# Patient Record
Sex: FEMALE | Race: WHITE | Employment: OTHER | URBAN - METROPOLITAN AREA
[De-identification: names, ages, dates, MRNs, and addresses within clinical notes are randomized per-mention and may not be internally consistent; named-entity substitution may affect disease eponyms.]

---

## 2017-01-01 ENCOUNTER — OFFICE VISIT (OUTPATIENT)
Dept: HEMATOLOGY/ONCOLOGY | Age: 72
End: 2017-01-01
Attending: INTERNAL MEDICINE
Payer: MEDICARE

## 2017-01-01 ENCOUNTER — HOSPITAL ENCOUNTER (OUTPATIENT)
Dept: CT IMAGING | Facility: HOSPITAL | Age: 72
Discharge: HOME | End: 2017-01-01
Attending: INTERNAL MEDICINE
Payer: MEDICARE

## 2017-01-01 ENCOUNTER — OFFICE VISIT (OUTPATIENT)
Dept: SURGERY | Facility: CLINIC | Age: 72
End: 2017-01-01

## 2017-01-01 ENCOUNTER — TELEPHONE (OUTPATIENT)
Dept: HEMATOLOGY/ONCOLOGY | Facility: HOSPITAL | Age: 72
End: 2017-01-01

## 2017-01-01 ENCOUNTER — SNF/IP PROF CHARGE ONLY (OUTPATIENT)
Dept: HEMATOLOGY/ONCOLOGY | Facility: HOSPITAL | Age: 72
End: 2017-01-01

## 2017-01-01 ENCOUNTER — APPOINTMENT (OUTPATIENT)
Dept: LAB | Age: 72
End: 2017-01-01
Attending: CLINICAL NURSE SPECIALIST
Payer: MEDICARE

## 2017-01-01 ENCOUNTER — OFFICE VISIT (OUTPATIENT)
Dept: FAMILY MEDICINE CLINIC | Facility: CLINIC | Age: 72
End: 2017-01-01

## 2017-01-01 ENCOUNTER — HOSPITAL ENCOUNTER (OUTPATIENT)
Dept: CV DIAGNOSTICS | Facility: HOSPITAL | Age: 72
Discharge: HOME | End: 2017-01-01
Attending: CLINICAL NURSE SPECIALIST
Payer: MEDICARE

## 2017-01-01 ENCOUNTER — APPOINTMENT (OUTPATIENT)
Dept: HEMATOLOGY/ONCOLOGY | Age: 72
End: 2017-01-01
Attending: INTERNAL MEDICINE
Payer: MEDICARE

## 2017-01-01 ENCOUNTER — HOSPITAL ENCOUNTER (OUTPATIENT)
Dept: CT IMAGING | Age: 72
Discharge: HOME | End: 2017-01-01
Attending: INTERNAL MEDICINE
Payer: MEDICARE

## 2017-01-01 ENCOUNTER — TELEPHONE (OUTPATIENT)
Dept: FAMILY MEDICINE CLINIC | Facility: CLINIC | Age: 72
End: 2017-01-01

## 2017-01-01 ENCOUNTER — NURSE ONLY (OUTPATIENT)
Dept: HEMATOLOGY/ONCOLOGY | Facility: HOSPITAL | Age: 72
End: 2017-01-01

## 2017-01-01 VITALS
HEIGHT: 65.2 IN | TEMPERATURE: 99 F | SYSTOLIC BLOOD PRESSURE: 152 MMHG | WEIGHT: 134.5 LBS | DIASTOLIC BLOOD PRESSURE: 91 MMHG | RESPIRATION RATE: 16 BRPM | BODY MASS INDEX: 22.14 KG/M2 | HEART RATE: 59 BPM

## 2017-01-01 VITALS
DIASTOLIC BLOOD PRESSURE: 91 MMHG | HEART RATE: 76 BPM | DIASTOLIC BLOOD PRESSURE: 76 MMHG | OXYGEN SATURATION: 98 % | RESPIRATION RATE: 18 BRPM | TEMPERATURE: 99 F | OXYGEN SATURATION: 98 % | TEMPERATURE: 98 F | SYSTOLIC BLOOD PRESSURE: 144 MMHG | RESPIRATION RATE: 18 BRPM | SYSTOLIC BLOOD PRESSURE: 130 MMHG | HEART RATE: 67 BPM | BODY MASS INDEX: 21 KG/M2 | WEIGHT: 126.69 LBS

## 2017-01-01 VITALS
RESPIRATION RATE: 16 BRPM | DIASTOLIC BLOOD PRESSURE: 79 MMHG | HEART RATE: 67 BPM | OXYGEN SATURATION: 97 % | WEIGHT: 131.19 LBS | BODY MASS INDEX: 22 KG/M2 | TEMPERATURE: 99 F | SYSTOLIC BLOOD PRESSURE: 163 MMHG

## 2017-01-01 VITALS
RESPIRATION RATE: 18 BRPM | HEART RATE: 64 BPM | SYSTOLIC BLOOD PRESSURE: 148 MMHG | DIASTOLIC BLOOD PRESSURE: 94 MMHG | HEART RATE: 53 BPM | DIASTOLIC BLOOD PRESSURE: 81 MMHG | BODY MASS INDEX: 22 KG/M2 | BODY MASS INDEX: 22 KG/M2 | WEIGHT: 132 LBS | WEIGHT: 133.69 LBS | TEMPERATURE: 99 F | SYSTOLIC BLOOD PRESSURE: 165 MMHG | TEMPERATURE: 99 F | OXYGEN SATURATION: 97 % | RESPIRATION RATE: 18 BRPM | OXYGEN SATURATION: 98 %

## 2017-01-01 VITALS
BODY MASS INDEX: 23 KG/M2 | RESPIRATION RATE: 18 BRPM | HEART RATE: 68 BPM | DIASTOLIC BLOOD PRESSURE: 79 MMHG | TEMPERATURE: 97 F | SYSTOLIC BLOOD PRESSURE: 169 MMHG | WEIGHT: 136.88 LBS

## 2017-01-01 VITALS
RESPIRATION RATE: 16 BRPM | DIASTOLIC BLOOD PRESSURE: 95 MMHG | SYSTOLIC BLOOD PRESSURE: 167 MMHG | TEMPERATURE: 99 F | HEART RATE: 67 BPM

## 2017-01-01 VITALS
TEMPERATURE: 99 F | SYSTOLIC BLOOD PRESSURE: 165 MMHG | OXYGEN SATURATION: 99 % | RESPIRATION RATE: 16 BRPM | DIASTOLIC BLOOD PRESSURE: 100 MMHG | HEART RATE: 86 BPM | WEIGHT: 130.31 LBS | BODY MASS INDEX: 22 KG/M2

## 2017-01-01 VITALS
BODY MASS INDEX: 20.68 KG/M2 | SYSTOLIC BLOOD PRESSURE: 150 MMHG | OXYGEN SATURATION: 99 % | DIASTOLIC BLOOD PRESSURE: 87 MMHG | RESPIRATION RATE: 16 BRPM | HEIGHT: 65.2 IN | TEMPERATURE: 99 F | HEART RATE: 69 BPM | WEIGHT: 125.63 LBS

## 2017-01-01 VITALS
TEMPERATURE: 100 F | WEIGHT: 127.88 LBS | DIASTOLIC BLOOD PRESSURE: 70 MMHG | RESPIRATION RATE: 16 BRPM | BODY MASS INDEX: 21 KG/M2 | SYSTOLIC BLOOD PRESSURE: 145 MMHG | OXYGEN SATURATION: 100 % | HEART RATE: 102 BPM

## 2017-01-01 VITALS
WEIGHT: 135 LBS | HEART RATE: 80 BPM | SYSTOLIC BLOOD PRESSURE: 142 MMHG | TEMPERATURE: 98 F | DIASTOLIC BLOOD PRESSURE: 90 MMHG | BODY MASS INDEX: 22.49 KG/M2 | HEIGHT: 65 IN | RESPIRATION RATE: 16 BRPM

## 2017-01-01 VITALS
BODY MASS INDEX: 23 KG/M2 | TEMPERATURE: 98 F | HEART RATE: 59 BPM | RESPIRATION RATE: 16 BRPM | WEIGHT: 135.69 LBS | DIASTOLIC BLOOD PRESSURE: 102 MMHG | SYSTOLIC BLOOD PRESSURE: 163 MMHG

## 2017-01-01 VITALS
BODY MASS INDEX: 22 KG/M2 | OXYGEN SATURATION: 97 % | SYSTOLIC BLOOD PRESSURE: 147 MMHG | HEART RATE: 50 BPM | RESPIRATION RATE: 18 BRPM | DIASTOLIC BLOOD PRESSURE: 81 MMHG | TEMPERATURE: 99 F | WEIGHT: 132.63 LBS

## 2017-01-01 VITALS
OXYGEN SATURATION: 97 % | HEART RATE: 77 BPM | WEIGHT: 124.31 LBS | RESPIRATION RATE: 18 BRPM | SYSTOLIC BLOOD PRESSURE: 144 MMHG | BODY MASS INDEX: 21 KG/M2 | DIASTOLIC BLOOD PRESSURE: 83 MMHG | TEMPERATURE: 99 F

## 2017-01-01 VITALS
WEIGHT: 135.38 LBS | SYSTOLIC BLOOD PRESSURE: 156 MMHG | DIASTOLIC BLOOD PRESSURE: 75 MMHG | HEART RATE: 63 BPM | TEMPERATURE: 98 F | HEIGHT: 65 IN | RESPIRATION RATE: 18 BRPM | BODY MASS INDEX: 22.56 KG/M2

## 2017-01-01 VITALS
RESPIRATION RATE: 18 BRPM | SYSTOLIC BLOOD PRESSURE: 170 MMHG | WEIGHT: 133 LBS | HEART RATE: 68 BPM | OXYGEN SATURATION: 97 % | DIASTOLIC BLOOD PRESSURE: 80 MMHG | BODY MASS INDEX: 22 KG/M2 | TEMPERATURE: 99 F

## 2017-01-01 VITALS
DIASTOLIC BLOOD PRESSURE: 76 MMHG | HEART RATE: 120 BPM | RESPIRATION RATE: 18 BRPM | TEMPERATURE: 98 F | OXYGEN SATURATION: 96 % | SYSTOLIC BLOOD PRESSURE: 111 MMHG

## 2017-01-01 VITALS
OXYGEN SATURATION: 98 % | RESPIRATION RATE: 18 BRPM | WEIGHT: 126.88 LBS | SYSTOLIC BLOOD PRESSURE: 155 MMHG | DIASTOLIC BLOOD PRESSURE: 96 MMHG | HEART RATE: 91 BPM | BODY MASS INDEX: 21 KG/M2 | TEMPERATURE: 98 F

## 2017-01-01 VITALS
OXYGEN SATURATION: 96 % | WEIGHT: 132.69 LBS | TEMPERATURE: 99 F | SYSTOLIC BLOOD PRESSURE: 176 MMHG | DIASTOLIC BLOOD PRESSURE: 85 MMHG | RESPIRATION RATE: 18 BRPM | HEART RATE: 64 BPM | BODY MASS INDEX: 22 KG/M2

## 2017-01-01 VITALS
WEIGHT: 127.13 LBS | HEART RATE: 82 BPM | TEMPERATURE: 99 F | SYSTOLIC BLOOD PRESSURE: 155 MMHG | OXYGEN SATURATION: 98 % | BODY MASS INDEX: 21 KG/M2 | DIASTOLIC BLOOD PRESSURE: 82 MMHG | RESPIRATION RATE: 18 BRPM

## 2017-01-01 VITALS
WEIGHT: 134.63 LBS | TEMPERATURE: 98 F | SYSTOLIC BLOOD PRESSURE: 162 MMHG | RESPIRATION RATE: 18 BRPM | OXYGEN SATURATION: 97 % | BODY MASS INDEX: 22 KG/M2 | DIASTOLIC BLOOD PRESSURE: 66 MMHG

## 2017-01-01 VITALS
HEIGHT: 65 IN | BODY MASS INDEX: 21.66 KG/M2 | DIASTOLIC BLOOD PRESSURE: 76 MMHG | TEMPERATURE: 99 F | SYSTOLIC BLOOD PRESSURE: 136 MMHG | WEIGHT: 130 LBS | HEART RATE: 70 BPM

## 2017-01-01 DIAGNOSIS — C77.3 METASTATIC CANCER TO AXILLARY LYMPH NODES (HCC): ICD-10-CM

## 2017-01-01 DIAGNOSIS — C50.112 MALIGNANT NEOPLASM OF CENTRAL PORTION OF LEFT FEMALE BREAST, UNSPECIFIED ESTROGEN RECEPTOR STATUS (HCC): ICD-10-CM

## 2017-01-01 DIAGNOSIS — C78.7 METASTATIC CANCER TO LIVER (HCC): ICD-10-CM

## 2017-01-01 DIAGNOSIS — C50.112 MALIGNANT NEOPLASM OF CENTRAL PORTION OF LEFT BREAST IN FEMALE, ESTROGEN RECEPTOR POSITIVE (HCC): Primary | ICD-10-CM

## 2017-01-01 DIAGNOSIS — K52.1 CHEMOTHERAPY INDUCED DIARRHEA: ICD-10-CM

## 2017-01-01 DIAGNOSIS — C50.112 MALIGNANT NEOPLASM OF CENTRAL PORTION OF LEFT FEMALE BREAST (HCC): ICD-10-CM

## 2017-01-01 DIAGNOSIS — C50.112 MALIGNANT NEOPLASM OF CENTRAL PORTION OF LEFT BREAST IN FEMALE, ESTROGEN RECEPTOR POSITIVE (HCC): ICD-10-CM

## 2017-01-01 DIAGNOSIS — Z17.0 MALIGNANT NEOPLASM OF CENTRAL PORTION OF LEFT BREAST IN FEMALE, ESTROGEN RECEPTOR POSITIVE (HCC): ICD-10-CM

## 2017-01-01 DIAGNOSIS — T45.1X5D ADVERSE EFFECT OF CHEMOTHERAPY, SUBSEQUENT ENCOUNTER: ICD-10-CM

## 2017-01-01 DIAGNOSIS — C78.7 LIVER METASTASES (HCC): ICD-10-CM

## 2017-01-01 DIAGNOSIS — Z17.0 MALIGNANT NEOPLASM OF CENTRAL PORTION OF LEFT BREAST IN FEMALE, ESTROGEN RECEPTOR POSITIVE (HCC): Primary | ICD-10-CM

## 2017-01-01 DIAGNOSIS — C50.112 MALIGNANT NEOPLASM OF CENTRAL PORTION OF LEFT FEMALE BREAST (HCC): Primary | ICD-10-CM

## 2017-01-01 DIAGNOSIS — C78.7 METASTATIC CANCER TO LIVER (HCC): Primary | ICD-10-CM

## 2017-01-01 DIAGNOSIS — C50.919 METASTATIC BREAST CARCINOMA (HCC): Primary | ICD-10-CM

## 2017-01-01 DIAGNOSIS — C50.919 METASTATIC BREAST CARCINOMA (HCC): ICD-10-CM

## 2017-01-01 DIAGNOSIS — T45.1X5A CHEMOTHERAPY INDUCED DIARRHEA: ICD-10-CM

## 2017-01-01 DIAGNOSIS — M89.9 BONE DISORDER: ICD-10-CM

## 2017-01-01 DIAGNOSIS — I49.9 IRREGULAR HEART RATE: Primary | ICD-10-CM

## 2017-01-01 DIAGNOSIS — I49.3 PVC (PREMATURE VENTRICULAR CONTRACTION): ICD-10-CM

## 2017-01-01 DIAGNOSIS — I49.9 IRREGULAR HEART RATE: ICD-10-CM

## 2017-01-01 DIAGNOSIS — R19.7 DIARRHEA, UNSPECIFIED TYPE: ICD-10-CM

## 2017-01-01 DIAGNOSIS — R09.81 NASAL CONGESTION: ICD-10-CM

## 2017-01-01 DIAGNOSIS — D50.8 OTHER IRON DEFICIENCY ANEMIA: ICD-10-CM

## 2017-01-01 DIAGNOSIS — T45.1X5D CHEMOTHERAPY ADVERSE REACTION, SUBSEQUENT ENCOUNTER: ICD-10-CM

## 2017-01-01 DIAGNOSIS — Z51.11 ENCOUNTER FOR ANTINEOPLASTIC CHEMOTHERAPY: ICD-10-CM

## 2017-01-01 DIAGNOSIS — I10 ESSENTIAL HYPERTENSION: Primary | ICD-10-CM

## 2017-01-01 DIAGNOSIS — F41.9 ANXIETY: ICD-10-CM

## 2017-01-01 DIAGNOSIS — Z51.11 ENCOUNTER FOR CHEMOTHERAPY MANAGEMENT: ICD-10-CM

## 2017-01-01 DIAGNOSIS — R50.9 FEVER AND CHILLS: ICD-10-CM

## 2017-01-01 DIAGNOSIS — Z71.89 OTHER SPECIFIED COUNSELING: ICD-10-CM

## 2017-01-01 DIAGNOSIS — R11.2 NON-INTRACTABLE VOMITING WITH NAUSEA, UNSPECIFIED VOMITING TYPE: ICD-10-CM

## 2017-01-01 DIAGNOSIS — K76.9 LIVER LESION: ICD-10-CM

## 2017-01-01 DIAGNOSIS — R50.9 FEVER AND CHILLS: Primary | ICD-10-CM

## 2017-01-01 DIAGNOSIS — Z51.11 ENCOUNTER FOR ANTINEOPLASTIC CHEMOTHERAPY: Primary | ICD-10-CM

## 2017-01-01 DIAGNOSIS — C77.3 METASTATIC CANCER TO AXILLARY LYMPH NODES (HCC): Primary | ICD-10-CM

## 2017-01-01 LAB
25-HYDROXYVITAMIN D (TOTAL): 21.6 NG/ML (ref 30–100)
ALBUMIN SERPL-MCNC: 3.3 G/DL (ref 3.5–4.8)
ALBUMIN SERPL-MCNC: 3.3 G/DL (ref 3.5–4.8)
ALBUMIN SERPL-MCNC: 3.4 G/DL (ref 3.5–4.8)
ALBUMIN SERPL-MCNC: 3.5 G/DL (ref 3.5–4.8)
ALBUMIN SERPL-MCNC: 3.5 G/DL (ref 3.5–4.8)
ALBUMIN SERPL-MCNC: 3.6 G/DL (ref 3.5–4.8)
ALP LIVER SERPL-CCNC: 100 U/L (ref 55–142)
ALP LIVER SERPL-CCNC: 102 U/L (ref 55–142)
ALP LIVER SERPL-CCNC: 103 U/L (ref 55–142)
ALP LIVER SERPL-CCNC: 105 U/L (ref 55–142)
ALP LIVER SERPL-CCNC: 106 U/L (ref 55–142)
ALP LIVER SERPL-CCNC: 97 U/L (ref 55–142)
ALP LIVER SERPL-CCNC: 98 U/L (ref 55–142)
ALP LIVER SERPL-CCNC: 99 U/L (ref 55–142)
ALT SERPL-CCNC: 16 U/L (ref 14–54)
ALT SERPL-CCNC: 17 U/L (ref 14–54)
ALT SERPL-CCNC: 18 U/L (ref 14–54)
ALT SERPL-CCNC: 18 U/L (ref 14–54)
ALT SERPL-CCNC: 19 U/L (ref 14–54)
ALT SERPL-CCNC: 19 U/L (ref 14–54)
ALT SERPL-CCNC: 20 U/L (ref 14–54)
ALT SERPL-CCNC: 21 U/L (ref 14–54)
AST SERPL-CCNC: 18 U/L (ref 15–41)
AST SERPL-CCNC: 18 U/L (ref 15–41)
AST SERPL-CCNC: 20 U/L (ref 15–41)
AST SERPL-CCNC: 20 U/L (ref 15–41)
AST SERPL-CCNC: 22 U/L (ref 15–41)
AST SERPL-CCNC: 23 U/L (ref 15–41)
ATRIAL RATE: 61 BPM
BASOPHILS # BLD AUTO: 0.02 X10(3) UL (ref 0–0.1)
BASOPHILS # BLD AUTO: 0.03 X10(3) UL (ref 0–0.1)
BASOPHILS NFR BLD AUTO: 0.4 %
BASOPHILS NFR BLD AUTO: 0.5 %
BASOPHILS NFR BLD AUTO: 0.6 %
BILIRUB SERPL-MCNC: 0.4 MG/DL (ref 0.1–2)
BILIRUB SERPL-MCNC: 0.4 MG/DL (ref 0.1–2)
BILIRUB SERPL-MCNC: 0.5 MG/DL (ref 0.1–2)
BREAST CARCINOMA AG (CA2729): 22.9 U/ML (ref ?–38)
BREAST CARCINOMA AG (CA2729): 25.5 U/ML (ref ?–38)
BREAST CARCINOMA AG (CA2729): 28.3 U/ML (ref ?–38)
BREAST CARCINOMA AG (CA2729): 28.4 U/ML (ref ?–38)
BREAST CARCINOMA AG (CA2729): 31.7 U/ML (ref ?–38)
BREAST CARCINOMA AG (CA2729): 31.9 U/ML (ref ?–38)
BREAST CARCINOMA AG (CA2729): 32.6 U/ML (ref ?–38)
BREAST CARCINOMA AG (CA2729): 37 U/ML (ref ?–38)
BREAST CARCINOMA AG (CA2729): 42.5 U/ML (ref ?–38)
BUN BLD-MCNC: 10 MG/DL (ref 8–20)
BUN BLD-MCNC: 11 MG/DL (ref 8–20)
BUN BLD-MCNC: 11 MG/DL (ref 8–20)
BUN BLD-MCNC: 12 MG/DL (ref 8–20)
BUN BLD-MCNC: 12 MG/DL (ref 8–20)
BUN BLD-MCNC: 13 MG/DL (ref 8–20)
BUN BLD-MCNC: 14 MG/DL (ref 8–20)
BUN BLD-MCNC: 9 MG/DL (ref 8–20)
CALCIUM BLD-MCNC: 8.5 MG/DL (ref 8.3–10.3)
CALCIUM BLD-MCNC: 8.6 MG/DL (ref 8.3–10.3)
CALCIUM BLD-MCNC: 8.7 MG/DL (ref 8.3–10.3)
CALCIUM BLD-MCNC: 8.7 MG/DL (ref 8.3–10.3)
CALCIUM BLD-MCNC: 8.8 MG/DL (ref 8.3–10.3)
CALCIUM BLD-MCNC: 8.9 MG/DL (ref 8.3–10.3)
CALCIUM BLD-MCNC: 9 MG/DL (ref 8.3–10.3)
CALCIUM BLD-MCNC: 9.2 MG/DL (ref 8.3–10.3)
CANCER AG 15-3 (CA15-3): 22.1 U/ML (ref ?–30)
CHLORIDE: 108 MMOL/L (ref 101–111)
CHLORIDE: 109 MMOL/L (ref 101–111)
CHLORIDE: 109 MMOL/L (ref 101–111)
CHLORIDE: 110 MMOL/L (ref 101–111)
CO2: 26 MMOL/L (ref 22–32)
CO2: 27 MMOL/L (ref 22–32)
CO2: 27 MMOL/L (ref 22–32)
CO2: 28 MMOL/L (ref 22–32)
CO2: 28 MMOL/L (ref 22–32)
CO2: 29 MMOL/L (ref 22–32)
CREAT BLD-MCNC: 0.65 MG/DL (ref 0.55–1.02)
CREAT BLD-MCNC: 0.69 MG/DL (ref 0.55–1.02)
CREAT BLD-MCNC: 0.71 MG/DL (ref 0.55–1.02)
CREAT BLD-MCNC: 0.71 MG/DL (ref 0.55–1.02)
CREAT BLD-MCNC: 0.74 MG/DL (ref 0.55–1.02)
CREAT BLD-MCNC: 0.74 MG/DL (ref 0.55–1.02)
CREAT BLD-MCNC: 0.77 MG/DL (ref 0.55–1.02)
CREAT BLD-MCNC: 0.78 MG/DL (ref 0.55–1.02)
CREAT SERPL-MCNC: 0.7 MG/DL (ref 0.4–1)
DEPRECATED HBV CORE AB SER IA-ACNC: 165.6 NG/ML (ref 10–291)
EOSINOPHIL # BLD AUTO: 0.1 X10(3) UL (ref 0–0.3)
EOSINOPHIL # BLD AUTO: 0.14 X10(3) UL (ref 0–0.3)
EOSINOPHIL # BLD AUTO: 0.15 X10(3) UL (ref 0–0.3)
EOSINOPHIL # BLD AUTO: 0.15 X10(3) UL (ref 0–0.3)
EOSINOPHIL # BLD AUTO: 0.16 X10(3) UL (ref 0–0.3)
EOSINOPHIL # BLD AUTO: 0.21 X10(3) UL (ref 0–0.3)
EOSINOPHIL # BLD AUTO: 0.23 X10(3) UL (ref 0–0.3)
EOSINOPHIL # BLD AUTO: 0.29 X10(3) UL (ref 0–0.3)
EOSINOPHIL NFR BLD AUTO: 1.8 %
EOSINOPHIL NFR BLD AUTO: 2.4 %
EOSINOPHIL NFR BLD AUTO: 2.4 %
EOSINOPHIL NFR BLD AUTO: 2.5 %
EOSINOPHIL NFR BLD AUTO: 2.7 %
EOSINOPHIL NFR BLD AUTO: 3.6 %
EOSINOPHIL NFR BLD AUTO: 4.3 %
EOSINOPHIL NFR BLD AUTO: 4.9 %
ERYTHROCYTE [DISTWIDTH] IN BLOOD BY AUTOMATED COUNT: 12.2 % (ref 11.5–16)
ERYTHROCYTE [DISTWIDTH] IN BLOOD BY AUTOMATED COUNT: 12.4 % (ref 11.5–16)
ERYTHROCYTE [DISTWIDTH] IN BLOOD BY AUTOMATED COUNT: 12.5 % (ref 11.5–16)
ERYTHROCYTE [DISTWIDTH] IN BLOOD BY AUTOMATED COUNT: 12.6 % (ref 11.5–16)
ERYTHROCYTE [DISTWIDTH] IN BLOOD BY AUTOMATED COUNT: 12.6 % (ref 11.5–16)
GLUCOSE BLD-MCNC: 79 MG/DL (ref 70–99)
GLUCOSE BLD-MCNC: 83 MG/DL (ref 70–99)
GLUCOSE BLD-MCNC: 87 MG/DL (ref 70–99)
GLUCOSE BLD-MCNC: 89 MG/DL (ref 65–99)
GLUCOSE BLD-MCNC: 89 MG/DL (ref 70–99)
GLUCOSE BLD-MCNC: 90 MG/DL (ref 70–99)
GLUCOSE BLD-MCNC: 93 MG/DL (ref 70–99)
HCT VFR BLD AUTO: 35 % (ref 34–50)
HCT VFR BLD AUTO: 35.2 % (ref 34–50)
HCT VFR BLD AUTO: 35.5 % (ref 34–50)
HCT VFR BLD AUTO: 35.5 % (ref 34–50)
HCT VFR BLD AUTO: 35.7 % (ref 34–50)
HCT VFR BLD AUTO: 36.3 % (ref 34–50)
HCT VFR BLD AUTO: 36.4 % (ref 34–50)
HCT VFR BLD AUTO: 36.6 % (ref 34–50)
HGB BLD-MCNC: 11.5 G/DL (ref 12–16)
HGB BLD-MCNC: 11.5 G/DL (ref 12–16)
HGB BLD-MCNC: 11.6 G/DL (ref 12–16)
HGB BLD-MCNC: 11.7 G/DL (ref 12–16)
HGB BLD-MCNC: 11.7 G/DL (ref 12–16)
HGB BLD-MCNC: 11.8 G/DL (ref 12–16)
HGB BLD-MCNC: 11.8 G/DL (ref 12–16)
HGB BLD-MCNC: 11.9 G/DL (ref 12–16)
IMMATURE GRANULOCYTE COUNT: 0.01 X10(3) UL (ref 0–1)
IMMATURE GRANULOCYTE COUNT: 0.02 X10(3) UL (ref 0–1)
IMMATURE GRANULOCYTE COUNT: 0.02 X10(3) UL (ref 0–1)
IMMATURE GRANULOCYTE RATIO %: 0.2 %
IMMATURE GRANULOCYTE RATIO %: 0.3 %
IMMATURE GRANULOCYTE RATIO %: 0.3 %
IRON SATURATION: 12 % (ref 13–45)
IRON: 39 UG/DL (ref 28–170)
ISTAT BLOOD GAS TCO2: 25 MMOL/L (ref 22–32)
ISTAT BUN: 13 MG/DL (ref 8–20)
ISTAT CHLORIDE: 104 MMOL/L (ref 101–111)
ISTAT HEMATOCRIT: 27 % (ref 37–54)
ISTAT IONIZED CALCIUM: 1.23 MMOL/L (ref 1.12–1.32)
ISTAT POTASSIUM: 3.8 MMOL/L (ref 3.6–5.1)
ISTAT SODIUM: 142 MMOL/L (ref 136–144)
LYMPHOCYTES # BLD AUTO: 1.29 X10(3) UL (ref 0.9–4)
LYMPHOCYTES # BLD AUTO: 1.41 X10(3) UL (ref 0.9–4)
LYMPHOCYTES # BLD AUTO: 1.56 X10(3) UL (ref 0.9–4)
LYMPHOCYTES # BLD AUTO: 1.56 X10(3) UL (ref 0.9–4)
LYMPHOCYTES # BLD AUTO: 1.59 X10(3) UL (ref 0.9–4)
LYMPHOCYTES # BLD AUTO: 1.62 X10(3) UL (ref 0.9–4)
LYMPHOCYTES # BLD AUTO: 1.63 X10(3) UL (ref 0.9–4)
LYMPHOCYTES # BLD AUTO: 1.66 X10(3) UL (ref 0.9–4)
LYMPHOCYTES NFR BLD AUTO: 23.3 %
LYMPHOCYTES NFR BLD AUTO: 24.6 %
LYMPHOCYTES NFR BLD AUTO: 24.7 %
LYMPHOCYTES NFR BLD AUTO: 26.8 %
LYMPHOCYTES NFR BLD AUTO: 26.9 %
LYMPHOCYTES NFR BLD AUTO: 27.6 %
LYMPHOCYTES NFR BLD AUTO: 28.2 %
LYMPHOCYTES NFR BLD AUTO: 30.6 %
M PROTEIN MFR SERPL ELPH: 6.9 G/DL (ref 6.1–8.3)
M PROTEIN MFR SERPL ELPH: 7.1 G/DL (ref 6.1–8.3)
M PROTEIN MFR SERPL ELPH: 7.1 G/DL (ref 6.1–8.3)
M PROTEIN MFR SERPL ELPH: 7.2 G/DL (ref 6.1–8.3)
M PROTEIN MFR SERPL ELPH: 7.2 G/DL (ref 6.1–8.3)
M PROTEIN MFR SERPL ELPH: 7.4 G/DL (ref 6.1–8.3)
MCH RBC QN AUTO: 29 PG (ref 27–33.2)
MCH RBC QN AUTO: 29.1 PG (ref 27–33.2)
MCH RBC QN AUTO: 29.1 PG (ref 27–33.2)
MCH RBC QN AUTO: 29.3 PG (ref 27–33.2)
MCH RBC QN AUTO: 29.6 PG (ref 27–33.2)
MCH RBC QN AUTO: 29.6 PG (ref 27–33.2)
MCH RBC QN AUTO: 29.8 PG (ref 27–33.2)
MCH RBC QN AUTO: 30 PG (ref 27–33.2)
MCHC RBC AUTO-ENTMCNC: 32.4 G/DL (ref 31–37)
MCHC RBC AUTO-ENTMCNC: 32.4 G/DL (ref 31–37)
MCHC RBC AUTO-ENTMCNC: 32.5 G/DL (ref 31–37)
MCHC RBC AUTO-ENTMCNC: 32.9 G/DL (ref 31–37)
MCHC RBC AUTO-ENTMCNC: 33 G/DL (ref 31–37)
MCHC RBC AUTO-ENTMCNC: 33.2 G/DL (ref 31–37)
MCV RBC AUTO: 89.2 FL (ref 81–100)
MCV RBC AUTO: 89.7 FL (ref 81–100)
MCV RBC AUTO: 89.8 FL (ref 81–100)
MCV RBC AUTO: 89.9 FL (ref 81–100)
MCV RBC AUTO: 90 FL (ref 81–100)
MCV RBC AUTO: 90.3 FL (ref 81–100)
MCV RBC AUTO: 91 FL (ref 81–100)
MCV RBC AUTO: 91 FL (ref 81–100)
MONOCYTES # BLD AUTO: 0.37 X10(3) UL (ref 0.1–0.6)
MONOCYTES # BLD AUTO: 0.4 X10(3) UL (ref 0.1–0.6)
MONOCYTES # BLD AUTO: 0.43 X10(3) UL (ref 0.1–0.6)
MONOCYTES # BLD AUTO: 0.43 X10(3) UL (ref 0.1–0.6)
MONOCYTES # BLD AUTO: 0.44 X10(3) UL (ref 0.1–0.6)
MONOCYTES # BLD AUTO: 0.48 X10(3) UL (ref 0.1–0.6)
MONOCYTES # BLD AUTO: 0.52 X10(3) UL (ref 0.1–0.6)
MONOCYTES # BLD AUTO: 0.65 X10(3) UL (ref 0.1–0.6)
MONOCYTES NFR BLD AUTO: 11.8 %
MONOCYTES NFR BLD AUTO: 6.3 %
MONOCYTES NFR BLD AUTO: 6.5 %
MONOCYTES NFR BLD AUTO: 7.3 %
MONOCYTES NFR BLD AUTO: 7.4 %
MONOCYTES NFR BLD AUTO: 8.1 %
MONOCYTES NFR BLD AUTO: 8.3 %
MONOCYTES NFR BLD AUTO: 8.8 %
NEUTROPHIL ABS PRELIM: 2.98 X10 (3) UL (ref 1.3–6.7)
NEUTROPHIL ABS PRELIM: 3.36 X10 (3) UL (ref 1.3–6.7)
NEUTROPHIL ABS PRELIM: 3.46 X10 (3) UL (ref 1.3–6.7)
NEUTROPHIL ABS PRELIM: 3.5 X10 (3) UL (ref 1.3–6.7)
NEUTROPHIL ABS PRELIM: 3.65 X10 (3) UL (ref 1.3–6.7)
NEUTROPHIL ABS PRELIM: 3.72 X10 (3) UL (ref 1.3–6.7)
NEUTROPHIL ABS PRELIM: 3.77 X10 (3) UL (ref 1.3–6.7)
NEUTROPHIL ABS PRELIM: 4.16 X10 (3) UL (ref 1.3–6.7)
NEUTROPHILS # BLD AUTO: 2.98 X10(3) UL (ref 1.3–6.7)
NEUTROPHILS # BLD AUTO: 3.36 X10(3) UL (ref 1.3–6.7)
NEUTROPHILS # BLD AUTO: 3.46 X10(3) UL (ref 1.3–6.7)
NEUTROPHILS # BLD AUTO: 3.5 X10(3) UL (ref 1.3–6.7)
NEUTROPHILS # BLD AUTO: 3.65 X10(3) UL (ref 1.3–6.7)
NEUTROPHILS # BLD AUTO: 3.72 X10(3) UL (ref 1.3–6.7)
NEUTROPHILS # BLD AUTO: 3.77 X10(3) UL (ref 1.3–6.7)
NEUTROPHILS # BLD AUTO: 4.16 X10(3) UL (ref 1.3–6.7)
NEUTROPHILS NFR BLD AUTO: 56.2 %
NEUTROPHILS NFR BLD AUTO: 57.3 %
NEUTROPHILS NFR BLD AUTO: 60.5 %
NEUTROPHILS NFR BLD AUTO: 61.7 %
NEUTROPHILS NFR BLD AUTO: 62.5 %
NEUTROPHILS NFR BLD AUTO: 62.6 %
NEUTROPHILS NFR BLD AUTO: 65.8 %
NEUTROPHILS NFR BLD AUTO: 65.8 %
P AXIS: 28 DEGREES
P-R INTERVAL: 128 MS
PLATELET # BLD AUTO: 191 10(3)UL (ref 150–450)
PLATELET # BLD AUTO: 191 10(3)UL (ref 150–450)
PLATELET # BLD AUTO: 192 10(3)UL (ref 150–450)
PLATELET # BLD AUTO: 192 10(3)UL (ref 150–450)
PLATELET # BLD AUTO: 195 10(3)UL (ref 150–450)
PLATELET # BLD AUTO: 197 10(3)UL (ref 150–450)
PLATELET # BLD AUTO: 204 10(3)UL (ref 150–450)
PLATELET # BLD AUTO: 218 10(3)UL (ref 150–450)
POTASSIUM SERPL-SCNC: 3.6 MMOL/L (ref 3.6–5.1)
POTASSIUM SERPL-SCNC: 3.7 MMOL/L (ref 3.6–5.1)
POTASSIUM SERPL-SCNC: 3.7 MMOL/L (ref 3.6–5.1)
POTASSIUM SERPL-SCNC: 3.8 MMOL/L (ref 3.6–5.1)
POTASSIUM SERPL-SCNC: 3.8 MMOL/L (ref 3.6–5.1)
POTASSIUM SERPL-SCNC: 3.9 MMOL/L (ref 3.6–5.1)
Q-T INTERVAL: 482 MS
QRS DURATION: 86 MS
QTC CALCULATION (BEZET): 485 MS
R AXIS: 23 DEGREES
RBC # BLD AUTO: 3.89 X10(6)UL (ref 3.8–5.1)
RBC # BLD AUTO: 3.9 X10(6)UL (ref 3.8–5.1)
RBC # BLD AUTO: 3.92 X10(6)UL (ref 3.8–5.1)
RBC # BLD AUTO: 3.93 X10(6)UL (ref 3.8–5.1)
RBC # BLD AUTO: 3.98 X10(6)UL (ref 3.8–5.1)
RBC # BLD AUTO: 4.02 X10(6)UL (ref 3.8–5.1)
RBC # BLD AUTO: 4.05 X10(6)UL (ref 3.8–5.1)
RBC # BLD AUTO: 4.07 X10(6)UL (ref 3.8–5.1)
RED CELL DISTRIBUTION WIDTH-SD: 40.7 FL (ref 35.1–46.3)
RED CELL DISTRIBUTION WIDTH-SD: 40.9 FL (ref 35.1–46.3)
RED CELL DISTRIBUTION WIDTH-SD: 41.1 FL (ref 35.1–46.3)
RED CELL DISTRIBUTION WIDTH-SD: 41.3 FL (ref 35.1–46.3)
RED CELL DISTRIBUTION WIDTH-SD: 41.4 FL (ref 35.1–46.3)
RED CELL DISTRIBUTION WIDTH-SD: 41.4 FL (ref 35.1–46.3)
RED CELL DISTRIBUTION WIDTH-SD: 41.6 FL (ref 35.1–46.3)
RED CELL DISTRIBUTION WIDTH-SD: 42 FL (ref 35.1–46.3)
SODIUM SERPL-SCNC: 140 MMOL/L (ref 136–144)
SODIUM SERPL-SCNC: 141 MMOL/L (ref 136–144)
SODIUM SERPL-SCNC: 141 MMOL/L (ref 136–144)
SODIUM SERPL-SCNC: 142 MMOL/L (ref 136–144)
SODIUM SERPL-SCNC: 143 MMOL/L (ref 136–144)
SODIUM SERPL-SCNC: 144 MMOL/L (ref 136–144)
T AXIS: 58 DEGREES
TOTAL IRON BINDING CAPACITY: 320 UG/DL (ref 298–536)
TRANSFERRIN: 215 MG/DL (ref 200–360)
VENTRICULAR RATE: 61 BPM
WBC # BLD AUTO: 5.3 X10(3) UL (ref 4–13)
WBC # BLD AUTO: 5.5 X10(3) UL (ref 4–13)
WBC # BLD AUTO: 5.7 X10(3) UL (ref 4–13)
WBC # BLD AUTO: 5.8 X10(3) UL (ref 4–13)
WBC # BLD AUTO: 5.9 X10(3) UL (ref 4–13)
WBC # BLD AUTO: 6.3 X10(3) UL (ref 4–13)

## 2017-01-01 PROCEDURE — 99214 OFFICE O/P EST MOD 30 MIN: CPT | Performed by: INTERNAL MEDICINE

## 2017-01-01 PROCEDURE — 74177 CT ABD & PELVIS W/CONTRAST: CPT | Performed by: INTERNAL MEDICINE

## 2017-01-01 PROCEDURE — 96374 THER/PROPH/DIAG INJ IV PUSH: CPT

## 2017-01-01 PROCEDURE — 86300 IMMUNOASSAY TUMOR CA 15-3: CPT

## 2017-01-01 PROCEDURE — 82728 ASSAY OF FERRITIN: CPT

## 2017-01-01 PROCEDURE — 80053 COMPREHEN METABOLIC PANEL: CPT

## 2017-01-01 PROCEDURE — 96375 TX/PRO/DX INJ NEW DRUG ADDON: CPT

## 2017-01-01 PROCEDURE — 96402 CHEMO HORMON ANTINEOPL SQ/IM: CPT

## 2017-01-01 PROCEDURE — 93010 ELECTROCARDIOGRAM REPORT: CPT | Performed by: INTERNAL MEDICINE

## 2017-01-01 PROCEDURE — 83540 ASSAY OF IRON: CPT

## 2017-01-01 PROCEDURE — 96413 CHEMO IV INFUSION 1 HR: CPT

## 2017-01-01 PROCEDURE — G9678 ONCOLOGY CARE MODEL SERVICE: HCPCS | Performed by: INTERNAL MEDICINE

## 2017-01-01 PROCEDURE — 87088 URINE BACTERIA CULTURE: CPT

## 2017-01-01 PROCEDURE — 99214 OFFICE O/P EST MOD 30 MIN: CPT | Performed by: CLINICAL NURSE SPECIALIST

## 2017-01-01 PROCEDURE — 87086 URINE CULTURE/COLONY COUNT: CPT

## 2017-01-01 PROCEDURE — 85025 COMPLETE CBC W/AUTO DIFF WBC: CPT

## 2017-01-01 PROCEDURE — 99215 OFFICE O/P EST HI 40 MIN: CPT | Performed by: INTERNAL MEDICINE

## 2017-01-01 PROCEDURE — 96417 CHEMO IV INFUS EACH ADDL SEQ: CPT

## 2017-01-01 PROCEDURE — 87186 SC STD MICRODIL/AGAR DIL: CPT

## 2017-01-01 PROCEDURE — 71260 CT THORAX DX C+: CPT | Performed by: INTERNAL MEDICINE

## 2017-01-01 PROCEDURE — 93306 TTE W/DOPPLER COMPLETE: CPT | Performed by: CLINICAL NURSE SPECIALIST

## 2017-01-01 PROCEDURE — 96377 APPLICATON ON-BODY INJECTOR: CPT

## 2017-01-01 PROCEDURE — 80047 BASIC METABLC PNL IONIZED CA: CPT

## 2017-01-01 PROCEDURE — 99214 OFFICE O/P EST MOD 30 MIN: CPT | Performed by: PHYSICIAN ASSISTANT

## 2017-01-01 PROCEDURE — 71260 CT THORAX DX C+: CPT

## 2017-01-01 PROCEDURE — 82306 VITAMIN D 25 HYDROXY: CPT

## 2017-01-01 PROCEDURE — 99211 OFF/OP EST MAY X REQ PHY/QHP: CPT

## 2017-01-01 PROCEDURE — 36591 DRAW BLOOD OFF VENOUS DEVICE: CPT

## 2017-01-01 PROCEDURE — 99215 OFFICE O/P EST HI 40 MIN: CPT | Performed by: CLINICAL NURSE SPECIALIST

## 2017-01-01 PROCEDURE — 85027 COMPLETE CBC AUTOMATED: CPT

## 2017-01-01 PROCEDURE — 74177 CT ABD & PELVIS W/CONTRAST: CPT

## 2017-01-01 PROCEDURE — 96361 HYDRATE IV INFUSION ADD-ON: CPT

## 2017-01-01 PROCEDURE — 87040 BLOOD CULTURE FOR BACTERIA: CPT

## 2017-01-01 PROCEDURE — 81001 URINALYSIS AUTO W/SCOPE: CPT

## 2017-01-01 PROCEDURE — 83550 IRON BINDING TEST: CPT

## 2017-01-01 PROCEDURE — 85007 BL SMEAR W/DIFF WBC COUNT: CPT

## 2017-01-01 PROCEDURE — 99213 OFFICE O/P EST LOW 20 MIN: CPT | Performed by: COLON & RECTAL SURGERY

## 2017-01-01 PROCEDURE — 96372 THER/PROPH/DIAG INJ SC/IM: CPT

## 2017-01-01 PROCEDURE — 93005 ELECTROCARDIOGRAM TRACING: CPT

## 2017-01-01 RX ORDER — LORAZEPAM 0.5 MG/1
TABLET ORAL EVERY 4 HOURS PRN
Status: CANCELLED | OUTPATIENT
Start: 2017-01-01

## 2017-01-01 RX ORDER — LORAZEPAM 2 MG/ML
INJECTION INTRAMUSCULAR EVERY 4 HOURS PRN
Status: CANCELLED | OUTPATIENT
Start: 2017-01-01

## 2017-01-01 RX ORDER — PROCHLORPERAZINE MALEATE 10 MG
10 TABLET ORAL EVERY 6 HOURS PRN
Status: CANCELLED | OUTPATIENT
Start: 2017-01-01

## 2017-01-01 RX ORDER — SODIUM CHLORIDE 9 MG/ML
1000 INJECTION, SOLUTION INTRAVENOUS ONCE
Status: COMPLETED | OUTPATIENT
Start: 2017-01-01 | End: 2017-01-01

## 2017-01-01 RX ORDER — LAMOTRIGINE 25 MG/1
500 TABLET ORAL ONCE
Status: CANCELLED | OUTPATIENT
Start: 2017-01-01

## 2017-01-01 RX ORDER — LAMOTRIGINE 25 MG/1
500 TABLET ORAL ONCE
Status: COMPLETED | OUTPATIENT
Start: 2017-01-01 | End: 2017-01-01

## 2017-01-01 RX ORDER — METOCLOPRAMIDE HYDROCHLORIDE 5 MG/ML
10 INJECTION INTRAMUSCULAR; INTRAVENOUS EVERY 6 HOURS PRN
Status: CANCELLED | OUTPATIENT
Start: 2017-01-01

## 2017-01-01 RX ORDER — ONDANSETRON 2 MG/ML
8 INJECTION INTRAMUSCULAR; INTRAVENOUS ONCE
Status: DISCONTINUED | OUTPATIENT
Start: 2017-01-01 | End: 2017-01-01

## 2017-01-01 RX ORDER — ONDANSETRON 2 MG/ML
8 INJECTION INTRAMUSCULAR; INTRAVENOUS EVERY 6 HOURS PRN
Status: CANCELLED | OUTPATIENT
Start: 2017-01-01

## 2017-01-01 RX ORDER — IPRATROPIUM BROMIDE 21 UG/1
2 SPRAY, METERED NASAL EVERY 12 HOURS
Qty: 1 BOTTLE | Refills: 0 | Status: SHIPPED | OUTPATIENT
Start: 2017-01-01 | End: 2017-01-01

## 2017-01-01 RX ORDER — METOPROLOL SUCCINATE 25 MG/1
25 TABLET, EXTENDED RELEASE ORAL
Qty: 90 TABLET | Refills: 0 | Status: SHIPPED | OUTPATIENT
Start: 2017-01-01 | End: 2017-01-01

## 2017-01-01 RX ORDER — SODIUM CHLORIDE 0.9 % (FLUSH) 0.9 %
10 SYRINGE (ML) INJECTION ONCE
Status: COMPLETED | OUTPATIENT
Start: 2017-01-01 | End: 2017-01-01

## 2017-01-01 RX ORDER — LETROZOLE 2.5 MG/1
TABLET, FILM COATED ORAL
Qty: 30 TABLET | Refills: 0 | Status: SHIPPED | OUTPATIENT
Start: 2017-01-01 | End: 2017-01-01

## 2017-01-01 RX ORDER — PROCHLORPERAZINE MALEATE 10 MG
10 TABLET ORAL EVERY 6 HOURS PRN
Qty: 40 TABLET | Refills: 3 | Status: SHIPPED | OUTPATIENT
Start: 2017-01-01 | End: 2017-01-01

## 2017-01-01 RX ORDER — ONDANSETRON 2 MG/ML
8 INJECTION INTRAMUSCULAR; INTRAVENOUS ONCE
Status: CANCELLED
Start: 2017-01-01 | End: 2017-01-01

## 2017-01-01 RX ORDER — SODIUM CHLORIDE 0.9 % (FLUSH) 0.9 %
10 SYRINGE (ML) INJECTION ONCE
Status: CANCELLED | OUTPATIENT
Start: 2017-01-01

## 2017-01-01 RX ORDER — ONDANSETRON HYDROCHLORIDE 8 MG/1
8 TABLET, FILM COATED ORAL EVERY 8 HOURS PRN
Qty: 30 TABLET | Refills: 3 | Status: SHIPPED | OUTPATIENT
Start: 2017-01-01

## 2017-01-01 RX ORDER — ALPRAZOLAM 0.25 MG/1
0.25 TABLET ORAL NIGHTLY PRN
Qty: 30 TABLET | Refills: 2 | Status: SHIPPED | OUTPATIENT
Start: 2017-01-01 | End: 2017-01-01

## 2017-01-01 RX ORDER — AMOXICILLIN 500 MG/1
500 TABLET, FILM COATED ORAL EVERY 8 HOURS
Qty: 30 TABLET | Refills: 0 | Status: SHIPPED | OUTPATIENT
Start: 2017-01-01 | End: 2018-01-01

## 2017-01-01 RX ORDER — LOPERAMIDE HYDROCHLORIDE 2 MG/1
2 TABLET ORAL 4 TIMES DAILY PRN
COMMUNITY

## 2017-01-01 RX ORDER — LETROZOLE 2.5 MG/1
TABLET, FILM COATED ORAL
Qty: 30 TABLET | Refills: 3 | Status: SHIPPED | OUTPATIENT
Start: 2017-01-01 | End: 2017-01-01

## 2017-01-01 RX ORDER — ERGOCALCIFEROL 1.25 MG/1
CAPSULE ORAL
Qty: 12 CAPSULE | Refills: 0 | OUTPATIENT
Start: 2017-01-01

## 2017-01-01 RX ORDER — METOPROLOL SUCCINATE 25 MG/1
25 TABLET, EXTENDED RELEASE ORAL
Qty: 90 TABLET | Refills: 0 | Status: SHIPPED | OUTPATIENT
Start: 2017-01-01

## 2017-01-01 RX ORDER — PROCHLORPERAZINE MALEATE 10 MG
TABLET ORAL
Qty: 360 TABLET | Refills: 3 | Status: SHIPPED | OUTPATIENT
Start: 2017-01-01 | End: 2018-01-01

## 2017-01-01 RX ADMIN — LAMOTRIGINE 500 MG: 25 TABLET ORAL at 10:55:00

## 2017-01-01 RX ADMIN — LAMOTRIGINE 500 MG: 25 TABLET ORAL at 12:00:00

## 2017-01-01 RX ADMIN — SODIUM CHLORIDE 0.9 % (FLUSH) 10 ML: 0.9 % SYRINGE (ML) INJECTION at 12:40:00

## 2017-01-01 RX ADMIN — SODIUM CHLORIDE 0.9 % (FLUSH) 10 ML: 0.9 % SYRINGE (ML) INJECTION at 13:29:00

## 2017-01-01 RX ADMIN — SODIUM CHLORIDE 0.9 % (FLUSH) 10 ML: 0.9 % SYRINGE (ML) INJECTION at 13:00:00

## 2017-01-01 RX ADMIN — SODIUM CHLORIDE 0.9 % (FLUSH) 10 ML: 0.9 % SYRINGE (ML) INJECTION at 11:30:00

## 2017-01-01 RX ADMIN — LAMOTRIGINE 500 MG: 25 TABLET ORAL at 11:08:00

## 2017-01-01 RX ADMIN — LAMOTRIGINE 500 MG: 25 TABLET ORAL at 11:28:00

## 2017-01-01 RX ADMIN — SODIUM CHLORIDE 0.9 % (FLUSH) 10 ML: 0.9 % SYRINGE (ML) INJECTION at 09:47:00

## 2017-01-01 RX ADMIN — SODIUM CHLORIDE 0.9 % (FLUSH) 10 ML: 0.9 % SYRINGE (ML) INJECTION at 11:11:00

## 2017-01-01 RX ADMIN — SODIUM CHLORIDE 0.9 % (FLUSH) 10 ML: 0.9 % SYRINGE (ML) INJECTION at 12:53:00

## 2017-01-01 RX ADMIN — SODIUM CHLORIDE 0.9 % (FLUSH) 10 ML: 0.9 % SYRINGE (ML) INJECTION at 11:26:00

## 2017-01-01 RX ADMIN — SODIUM CHLORIDE 1000 ML: 9 INJECTION, SOLUTION INTRAVENOUS at 12:20:00

## 2017-01-01 RX ADMIN — SODIUM CHLORIDE 0.9 % (FLUSH) 10 ML: 0.9 % SYRINGE (ML) INJECTION at 12:20:00

## 2017-01-09 ENCOUNTER — OFFICE VISIT (OUTPATIENT)
Dept: HEMATOLOGY/ONCOLOGY | Age: 72
End: 2017-01-09
Attending: INTERNAL MEDICINE
Payer: MEDICARE

## 2017-01-09 VITALS
WEIGHT: 136.38 LBS | HEART RATE: 63 BPM | RESPIRATION RATE: 16 BRPM | OXYGEN SATURATION: 98 % | TEMPERATURE: 97 F | SYSTOLIC BLOOD PRESSURE: 179 MMHG | BODY MASS INDEX: 23 KG/M2 | DIASTOLIC BLOOD PRESSURE: 80 MMHG

## 2017-01-09 DIAGNOSIS — C50.112 MALIGNANT NEOPLASM OF CENTRAL PORTION OF LEFT FEMALE BREAST (HCC): Primary | ICD-10-CM

## 2017-01-09 DIAGNOSIS — C78.7 METASTATIC CANCER TO LIVER (HCC): ICD-10-CM

## 2017-01-09 DIAGNOSIS — C77.3 METASTATIC CANCER TO AXILLARY LYMPH NODES (HCC): ICD-10-CM

## 2017-01-09 PROCEDURE — 96413 CHEMO IV INFUSION 1 HR: CPT

## 2017-01-09 RX ORDER — SODIUM CHLORIDE 0.9 % (FLUSH) 0.9 %
10 SYRINGE (ML) INJECTION ONCE
Status: COMPLETED | OUTPATIENT
Start: 2017-01-09 | End: 2017-01-09

## 2017-01-09 RX ADMIN — SODIUM CHLORIDE 0.9 % (FLUSH) 10 ML: 0.9 % SYRINGE (ML) INJECTION at 11:00:00

## 2017-01-09 NOTE — PROGRESS NOTES
Pt arrived for chemo treatment and denies any adverse S/S currently, pt stated she feels good, pt already has appt for next visit, pt alert and appears in nad currenlty, pt ambulated to treatment area with steady gait    Education Record    Learner:  Bing Jiang

## 2017-01-17 NOTE — PATIENT INSTRUCTIONS
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      The patient presented to [facility] with significant anemia in October 2015.   She was found to have a fulgurating left eduardo evidence of recurrent residual disease. She has known metastases to the liver that appear to be shrinking on the current treatment with Herceptin. She has a pulmonary nodule that is much less conspicuous and the testing from October 2016.     The port rem

## 2017-01-17 NOTE — PROGRESS NOTES
Follow Up Visit Note       Active Problems      1. Malignant neoplasm of central portion of left female breast-Her 2+    2. Metastatic breast carcinoma ([address]    3. 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  Metastatic cancer to liver ([address] antineoplastic chemotherapy      one dose of IV chemo so far, pt states power port being inserted for additional chemo         Past Surgical History    WRIST FRACTURE SURGERY Right 7 years ago    Comment w hardware    EYE SURGERY Left     COLONOSCOPY N/A 1 change. HENT: Negative for hearing loss, nosebleeds, sore throat and trouble swallowing. Respiratory: Negative for apnea, cough, shortness of breath and wheezing. Cardiovascular: Negative for chest pain, palpitations and leg swelling.    Gastrointes palpable supraclavicular lymph nodes. There is no evidence of lymphedema to either upper extremity. This patient remains stable without clinical evidence of recurrent residual disease. The port remains in the right superior anterior chest wall.   It menstrual cycle at age 10, her first full-term baby at age 23. She went through the change in life at age 44. She has no family history of breast cancer or ovarian cancer. She was never on hormone replacement therapy.     The patient has no current findi to the early detection of breast cancer. I've counseled the patient on how to perform the breast self-examination. The patient will obtain a mammogram immediately prior to her next visit.            No orders of the defined types were placed in this enc

## 2017-01-30 NOTE — PROGRESS NOTES
[facility] Progress Note      Patient Name:  Arian Lea  YOB: 1945  Medical Record Number:  [id number]    Date of visit: 1/30/2017    CHIEF COMPLAINT: Severe iron deficiency anemia left breast carcinoma.     HPI:      66year old t as needed for Pain. Disp:  Rfl:    letrozole 2.5 MG Oral Tab Take 1 tablet (2.5 mg total) by mouth once daily. Disp: 30 tablet Rfl: 11   acetaminophen (TYLENOL) 500 MG Oral Chew Tab Chew 500 mg by mouth every 6 (six) hours as needed for Pain.  Disp:  Rfl: antihistamines. ORDERS PLACED:    Return for Labs, chemo in 3 and 6 weeks. MD 6 weeks. Ivan Loredo M.D.     [facility]    [address], [address]    1/30/2017

## 2017-01-30 NOTE — PROGRESS NOTES
Pt here for MD f/u visit and due for herceptin for breast ca. Continues on Letrozole daily.  Pt voices doing well. Denies any other complaints @ this time.      Education Record    Learner:  Patient    Disease / Diagnosis:breast ca    Barriers / Limitations

## 2017-01-30 NOTE — PATIENT INSTRUCTIONS
For Dr. Junior Ballard nurse line, call  [phone] with any questions or concerns Monday through Friday 8:00 to 4:30.   After hours or weekends for emergent needs [phone]

## 2017-01-30 NOTE — PROGRESS NOTES
Education Record  Learner:  Patient  Disease / Diagnosis:   Breast cancer  Barriers / Limitations:  None  Method:  Printed material and Reinforcement  General Topics:  Plan of care reviewed; schedule  Outcome:  Shows understanding and Patient given copies

## 2017-02-20 NOTE — PROGRESS NOTES
Patient here for herceptin. BP elevated. Re-check 163/102. Patient denies any HA, dizziness, visual disturbances.   Reviewed above s/s and instructed patient to call or proceed to nearest Urgent Care for assessment  Education Record  Learner:  Patient  Justin Espinal

## 2017-02-23 NOTE — TELEPHONE ENCOUNTER
Pt scheduled upcoming appt with Lexie Connolly. Please close encounter, pending medications.  Thank you  Future Appointments  Date Time Provider Rashad Jazmine   2/27/2017 2:30 PM Amy Gallardo PA-C [facility] [address] [facility] [address]   3/13/2017 10:00 AM Tammy Parker

## 2017-02-27 NOTE — PATIENT INSTRUCTIONS
Thank you for choosing Stefan Ballesteros PA-C at [place] [address]  To Do: Jesus Shrestha  1. Pt to begin medications as prescribed  2.  Follow-up in 3 months, sooner if problems    • Please signup for MY CHART, which is electronic access to your record if y quality of life.     Referrals, and Radiology Information:    If your insurance requires a referral to a specialist, please allow 5 business days to process your referral request.    If Juan Carroll PA-C orders a CT or MRI, it may take up to 10 busines

## 2017-02-27 NOTE — TELEPHONE ENCOUNTER
[facility] order for re verification for Prolia injections from [facility]. Called [facility] where the injections are given. Spoke with Pattie Kruse who states that patient never started Prolia injections.

## 2017-02-27 NOTE — TELEPHONE ENCOUNTER
Call from [facility] about Prolia injections. Order in therapy plan- never initiated. Requested RN ask patient at her next visit on 3/13/17.

## 2017-02-27 NOTE — PROGRESS NOTES
[facility] Internal Medicine Progress Note    CC:  Patient presents with:  Medication Request: Pt declines flu shot today      HPI:   HPI  HTN  Pt is doing well on metoprolol  Denies any SOB/CP, leg swelling, headaches    Nasal congestion  Pt rec nausea and vomiting. Neurological: Negative for dizziness, light-headedness and headaches. Psychiatric/Behavioral: Positive for sleep disturbance. Negative for suicidal ideas, self-injury, dysphoric mood and decreased concentration.  The patient is nerv Si sprays by Nasal route every 12 (twelve) hours. alprazolam (XANAX) 0.25 MG Oral Tab 30 tablet 2      Sig: Take 1 tablet (0.25 mg total) by mouth nightly as needed for Sleep or Anxiety.            Imaging & Consults:  [facility]

## 2017-03-13 NOTE — PATIENT INSTRUCTIONS
For Dr. Karla Fernandez nurse line, call  [phone] with any questions or concerns Monday through Friday 8:00 to 4:30.   After hours or weekends for emergent needs [phone]

## 2017-03-13 NOTE — PROGRESS NOTES
Education Record    Learner:  Patient    Disease / Loyda Pacini cancer    Barriers / Limitations:  None    Method:  Brief focused, printed material and  reinforcement    General Topics:  Plan of care reviewed    Outcome:  Shows understanding

## 2017-03-13 NOTE — PATIENT INSTRUCTIONS
To reach Dr Constantine Scruggs nurse during business hours, please call [phone]. After hours, including weekends, evenings, and holidays, please call the main number [phone] for emergent needs.

## 2017-03-13 NOTE — PROGRESS NOTES
[facility] Progress Note      Patient Name:  Nissa April  YOB: 1945  Medical Record Number:  [id number]    Date of visit:3/13/2017    CHIEF COMPLAINT: Severe iron deficiency anemia left breast carcinoma.     HPI:      66year old th tablet Rfl: 0   letrozole 2.5 MG Oral Tab Take 1 tablet (2.5 mg total) by mouth once daily.  Disp: 30 tablet Rfl: 11   lidocaine-prilocaine (EMLA) 2.5-2.5 % External Cream Apply to site 1 hour prior to port a cath needle insertion Disp: 1 Tube Rfl: 0   alpr Value Ref Range   WBC 5.5 4.0-13.0 x10(3) uL   RBC 4.02 3.80-5.10 x10(6)uL   HGB 11.9 (L) 12.0-16.0 g/dL   HCT 36.6 34.0-50.0 %   .0 150.0-450.0 10(3)uL   MCV 91.0 81.0-100.0 fL   MCH 29.6 27.0-33.2 pg   MCHC 32.5 31.0-37.0 g/dL   RDW 12.6 11.5-16. 0

## 2017-03-13 NOTE — PROGRESS NOTES
Pt here for MD f/u visit and due for Herceptin. Continues on Letrozole daily. Pt reports cold symptoms since Sat- nasal congestion,watery eyes. Low grade fever on Saturday. +Fatigue.      Education Record    Learner:  Patient    Disease / Allyson leung

## 2017-03-14 PROBLEM — E55.9 VITAMIN D DEFICIENCY: Status: ACTIVE | Noted: 2017-01-01

## 2017-03-14 NOTE — TELEPHONE ENCOUNTER
Flavio Rodriguez MD  P Edw Chava Scott Rns                     Call, vit D low, Rx sent to pharmacy. Also slightly iron defi. Diogo OTC FeSO4 325 mg daily      Patient notified. Stated understanding. Instructed to call as needed.

## 2017-04-03 NOTE — PATIENT INSTRUCTIONS
To reach Dr Rahel Felipe nurse during business hours, please call [phone]. After hours, including weekends, evenings, and holidays, please call the main number [phone] for emergent needs.

## 2017-04-11 NOTE — TELEPHONE ENCOUNTER
Spoke to pt about CT. Foundation med test sent. Will wait for results before changing Rx. She is agreeable.

## 2017-04-24 NOTE — PROGRESS NOTES
Education Record    Learner:  Patient    Disease / Diagnosis: breast ca    Barriers / Limitations:  None   Comments:    Method:  Reinforcement   Comments:    General Topics:  Side effects and symptom management and Plan of care reviewed   Comments:    Brian Morrison

## 2017-04-24 NOTE — PROGRESS NOTES
Pt here for MD f/u visit and due for Herceptin. Continues on Letrozole daily. Pt denies any complaints @ this time.      Education Record    Learner:  Patient    Disease / Diagnosis:breast ca    Barriers / Limitations:  None   Comments:    Method:  Brief fo

## 2017-04-24 NOTE — PROGRESS NOTES
[facility] Progress Note      Patient Name:  Kumar Torres  YOB: 1945  Medical Record Number:  [id number]    Date of visit: 4/24/2017    CHIEF COMPLAINT: Metastatic HER-2 positive breast carcinoma.     HPI:      66year old that I hav for Sleep or Anxiety. Disp: 30 tablet Rfl: 2   Metoprolol Succinate ER 25 MG Oral Tablet 24 Hr Take 1 tablet (25 mg total) by mouth Daily Beta Blocker.  Disp: 90 tablet Rfl: 0   aspirin-acetaminophen-caffeine 250-250-65 MG Oral Tab Take 1 tablet by mouth ev .0 150.0-450.0 10(3)uL   MCV 90.2 81.0-100.0 fL   MCH 29.8 27.0-33.2 pg   MCHC 33.0 31.0-37.0 g/dL   RDW 12.7 11.5-16.0 %   RDW-SD 41.8 35.1-46.3 fL   Neutrophil Absolute Prelim 3.01 1.30-6.70 x10 (3) uL   Neutrophil Absolute 3.01 1.30-6.70 x10(3)

## 2017-04-24 NOTE — PATIENT INSTRUCTIONS
For Dr. Blaire Zheng nurse line, call  [phone] with any questions or concerns Monday through Friday 8:00 to 4:30.   After hours or weekends for emergent needs [phone]

## 2017-05-15 NOTE — PROGRESS NOTES
Education Record    Learner:  Patient    Disease / Sandro Offer cancer    Barriers / Limitations:  None    Method:  Brief focused, printed material and  reinforcement    General Topics:  Plan of care reviewed    Outcome:  Shows understanding'

## 2017-05-18 NOTE — TELEPHONE ENCOUNTER
Lm for pt that no actionable mutation noted on foundation one testing and that will talk to her about switching Letrozole to Faslodex next visit

## 2017-06-05 NOTE — PROGRESS NOTES
Education Record    Learner:  Patient    Disease / Booker Macario cancer    Barriers / Limitations:  None    Method:  Brief focused, printed material and  reinforcement    General Topics:  Plan of care reviewed    Outcome:  Shows understanding

## 2017-06-05 NOTE — PROGRESS NOTES
[facility] Progress Note      Patient Name:  Nikole Valencia  YOB: 1945  Medical Record Number:  [id number]    Date of visit: 6/5/2017    CHIEF COMPLAINT: Metastatic HER-2 positive breast carcinoma.     HPI:      66year old that I have aspirin-acetaminophen-caffeine 250-250-65 MG Oral Tab Take 1 tablet by mouth every 6 (six) hours as needed for Pain.  Disp:  Rfl:    lidocaine-prilocaine (EMLA) 2.5-2.5 % External Cream Apply to site 1 hour prior to port a cath needle insertion Disp: 1 Tu Neutrophil Absolute Prelim 3.60 1.30-6.70 x10 (3) uL   Neutrophil Absolute 3.60 1.30-6.70 x10(3) uL   Lymphocyte Absolute 1.50 0.90-4.00 x10(3) uL   Monocyte Absolute 0.37 0.10-0.60 x10(3) uL   Eosinophil Absolute 0.25 0.00-0.30 x10(3) uL   Basophil Abso

## 2017-06-05 NOTE — PROGRESS NOTES
here for MD f/u visit and due for Herceptin for breast ca. Continues on Letrozole daily. +hot flashes. Pt denies any other complaints @ this time.      Education Record    Learner:  Patient    Disease / Diagnosis:breast ca    Barriers / Limitations:  None

## 2017-06-19 NOTE — PROGRESS NOTES
Pt here for MD f/u visit and due for herceptin/faslodex for h/o breast ca. Pt reports having 3 days of loose stools after first faslodex injection. Energy level and appetite good. Denies pain.      Education Record    Learner:  Patient    Disease / Paipa Perla

## 2017-06-19 NOTE — PROGRESS NOTES
[facility] Progress Note      Patient Name:  Suzy Prieto  YOB: 1945  Medical Record Number:  [id number]    Date of visit: 6/19/2017    CHIEF COMPLAINT: Metastatic HER-2 positive breast carcinoma.     HPI:      66year old that I hav total) by mouth nightly as needed for Sleep or Anxiety. Disp: 30 tablet Rfl: 2   Metoprolol Succinate ER 25 MG Oral Tablet 24 Hr Take 1 tablet (25 mg total) by mouth Daily Beta Blocker.  Disp: 90 tablet Rfl: 0   aspirin-acetaminophen-caffeine 048-045-24 MG 12.0-16.0 g/dL   HCT 34.9 34.0-50.0 %   .0 150.0-450.0 10(3)uL   MCV 90.6 81.0-100.0 fL   MCH 29.9 27.0-33.2 pg   MCHC 33.0 31.0-37.0 g/dL   RDW 12.6 11.5-16.0 %   RDW-SD 41.5 35.1-46.3 fL   Neutrophil Absolute Prelim 3.10 1.30-6.70 x10 (3) uL   Jessica

## 2017-06-19 NOTE — PROGRESS NOTES
Education Record  Learner:  Patient  Disease / Diagnosis:   Breast cancer on herceptin  Barriers / Limitations:  None  Method:  Printed material and Reinforcement  General Topics:  Plan of care reviewed  Outcome:  Shows understanding and Patient given copi

## 2017-07-03 PROBLEM — I49.3 PVC (PREMATURE VENTRICULAR CONTRACTION): Status: ACTIVE | Noted: 2017-01-01

## 2017-07-03 NOTE — PROGRESS NOTES
Education Record    Learner:  Patient    Disease / Niranjan Oliver cancer    Barriers / Limitations:  None    Method:  Brief focused, printed material and  reinforcement    General Topics:  Plan of care reviewed    Outcome:  Shows understanding

## 2017-07-03 NOTE — PROGRESS NOTES
Pt here for APN visit and due for herceptin/faslodex/xgeva. Continues on Femara daily.      Education Record    Learner:  Patient    Disease / Milan bello    Barriers / Limitations:  None   Comments:    Method:  Brief focused and Printed material

## 2017-07-03 NOTE — PATIENT INSTRUCTIONS
To reach Dr Florian Waters nurse during business hours, please call [phone]. After hours, including weekends, evenings, and holidays, please call the main number [phone] for emergent needs.

## 2017-07-03 NOTE — PATIENT INSTRUCTIONS
For Dr. Ron Whitney nurse line, call  [phone] with any questions or concerns Monday through Friday 8:00 to 4:30.   After hours or weekends for emergent needs [phone]

## 2017-07-03 NOTE — PROGRESS NOTES
[facility] Progress Note    Patient Name: Bing Pretty   YOB: 1945   Medical Record Number: [id number]   CSN: [id number]   Date of visit: 7/3/2017   Provider: JÚNIOR Rutherford  Referring Physician: No ref.  provider found    Problem Lis 250-250-65 MG Oral Tab, Take 1 tablet by mouth every 6 (six) hours as needed for Pain., Disp: , Rfl:   •  LETROZOLE 2.5 MG Oral Tab, TAKE 1 TABLET(2.5 MG) BY MOUTH EVERY DAY, Disp: 30 tablet, Rfl: 3  •  Ipratropium Bromide (ATROVENT) 0.03 % Nasal Solution, Results (from the past 24 hour(s))  -COMP METABOLIC PANEL (14)   Collection Time: 07/03/17 10:15 AM   Result Value Ref Range   Glucose 87 70 - 99 mg/dL   BUN 12 8 - 20 mg/dL   Creatinine 0.65 0.55 - 1.02 mg/dL   GFR 88 >=60   Calcium, Total 8.9 8.3 - 10.3 HER-2 positive. On letrozole since 10/15 and started trastuzumab 11/15. Status post palliative left mastectomy. Restaging CT scan performed 3/28/17 unfortunately does show interval increase in the size of the liver lesion.   Foundation one testing was se

## 2017-07-17 NOTE — PROGRESS NOTES
[facility] Progress Note      Patient Name:  Ghulam Cee  YOB: 1945  Medical Record Number:  [id number]    Date of visit: 7/17/2017    CHIEF COMPLAINT: Metastatic HER-2 positive breast carcinoma.     HPI:      66year old that I hav needed for Pain.  Disp:  Rfl:    lidocaine-prilocaine (EMLA) 2.5-2.5 % External Cream Apply to site 1 hour prior to port a cath needle insertion Disp: 1 Tube Rfl: 0   LETROZOLE 2.5 MG Oral Tab TAKE 1 TABLET(2.5 MG) BY MOUTH EVERY DAY Disp: 30 tablet Rfl: 3 4.0 - 13.0 x10(3) uL   RBC 4.05 3.80 - 5.10 x10(6)uL   HGB 11.8 (L) 12.0 - 16.0 g/dL   HCT 36.4 34.0 - 50.0 %   .0 150.0 - 450.0 10(3)uL   MCV 89.9 81.0 - 100.0 fL   MCH 29.1 27.0 - 33.2 pg   MCHC 32.4 31.0 - 37.0 g/dL   RDW 12.5 11.5 - 16.0 %   RDW

## 2017-07-17 NOTE — PATIENT INSTRUCTIONS
To reach Dr Jonah Orellana nurse during business hours, please call [phone]. After hours, including weekends, evenings, and holidays, please call the main number [phone] for emergent needs.

## 2017-07-17 NOTE — PROGRESS NOTES
Education Record    Learner:  Patient    Disease / Lindsay Pickett cancer    Barriers / Limitations:  None    Method:  Brief focused, printed material and  reinforcement    General Topics:  Plan of care reviewed    Outcome:  Shows understanding

## 2017-07-17 NOTE — PROGRESS NOTES
Pt here for MD f/u visit and due for herceptin for h/o breast ca. Pt denies any complaints @ this time. Labs drawn.      Education Record    Learner:  Patient    Disease / Diagnosis:breast ca    Barriers / Limitations:  None   Comments:    Method:  Brief fo

## 2017-07-31 NOTE — PROGRESS NOTES
[facility] Progress Note    Patient Name: Abdulaziz Cunningham   YOB: 1945   Medical Record Number: [id number]   CSN: [id number]   Date of visit: 7/31/2017   Provider: JÚNIOR Persaud  Referring Physician: No ref.  provider found    Problem Joanna Mohamud constipation. Her mood is good.     Allergies:  No Known Allergies    Vital Signs:  See flowsheet      Medications:    Current Outpatient Prescriptions:   •  LETROZOLE 2.5 MG Oral Tab, TAKE 1 TABLET(2.5 MG) BY MOUTH EVERY DAY, Disp: 30 tablet, Rfl: 3  •  I Sodium 143 136 - 144 mmol/L   Potassium 3.7 3.6 - 5.1 mmol/L   Chloride 110 101 - 111 mmol/L   CO2 28.0 22.0 - 32.0 mmol/L   -CBC W/ DIFFERENTIAL   Collection Time: 07/31/17 10:00 AM   Result Value Ref Range   WBC 5.9 4.0 - 13.0 x10(3) uL   RBC 3.93 3.80 depression. We discussed issues of distress, coping difficulties and social support systems and currently there are no active problems identified.     JÚNIOR Chen [facility]

## 2017-07-31 NOTE — PROGRESS NOTES
Pt here for APN assessment and due for Herceptin/Faslodex for breast ca.  ECHO done on 7/21  Education Record    Learner:  Patient    Disease / Milan bello    Barriers / Limitations:  None   Comments:    Method:  Brief focused and Printed material

## 2017-07-31 NOTE — PROGRESS NOTES
Education Record    Learner:  Patient    Disease / Anastasia bello    Barriers / Limitations:  None   Comments:    Method:  Reinforcement   Comments:    General Topics:  Side effects and symptom management and Plan of care reviewed   Comments:    Outcom

## 2017-08-14 NOTE — PROGRESS NOTES
[facility] Progress Note      Patient Name:  Brittany Shin  YOB: 1945  Medical Record Number:  [id number]    Date of visit: 8/14/2017    CHIEF COMPLAINT: Metastatic HER-2 positive breast carcinoma.     HPI:      66year old that I hav 165/81 (1007)  Temp: 98.5 °F (36.9 °C) (1007)  Do Not Use - Resp Rate: --  SpO2: 98 % (1007)    PS:  ECO    PHYSICAL EXAM:    General: alert and oriented x 3, not in acute distress. HEENT: RADHA, oropharynx  clear. Neck: supple.   No

## 2017-08-14 NOTE — PROGRESS NOTES
Pt here for 2 week MD f/u and due for chemo. Energy level is up and down. Appetite is good. Denies pain. Pt has no new issues or complaints.

## 2017-08-14 NOTE — PROGRESS NOTES
Education Record    Learner:  Patient    Disease / Diagnosis: breast ca    Barriers / Limitations:  None   Comments:    Method:  Discussion   Comments:    General Topics:  Plan of care reviewed   Comments:    Outcome:  Shows understanding   Comments:    Pt

## 2017-08-28 NOTE — PROGRESS NOTES
Pt here for MD f/u visit and due for herceptin/faslodex for breast ca. Pt denies any complaints @ this time. CT scan done last week.    Education Record    Learner:  Patient    Disease / Diagnosis:breast ca    Barriers / Limitations:  None   Comments:    Me

## 2017-08-28 NOTE — PROGRESS NOTES
[facility] Progress Note      Patient Name:  Annalisa Hernandez  YOB: 1945  Medical Record Number:  [id number]    Date of visit: 8/28/2017    CHIEF COMPLAINT: Metastatic HER-2 positive breast carcinoma.     HPI:      66year old that I hav °F (36.5 °C) ( 102)  Do Not Use - Resp Rate: --  SpO2: 98 % (1022)    PS:  ECO    PHYSICAL EXAM:    General: alert and oriented x 3, not in acute distress. HEENT: RADHA, oropharynx  clear. Neck: supple. No JVD /adenopathy.   CVS: S1S2, re IMAGING:    PROCEDURE:  CT OF THE CHEST, ABDOMEN, AND PELVIS WITH CONTRAST 8/24/27     COMPARISON:  [facility], CT CHEST+ABDOMEN+PELVIS(ALL CNTRST ONLY)(CPT=71260/51591), 3/28/2017, 14:04.      INDICATIONS:  C78.7 Secondary malignant neoplasm of liver a mm arterial enhancing lesion the spleen, not significantly changed. PANCREAS:  Unremarkable. ADRENALS:  Unremarkable.   KIDNEYS:  There are subcentimeter hypodense foci within the kidneys, which are too small to characterize, but statistically represent s Diarrhea: Resolved. A total of 45 minutes were spent  with the patient during this encounter and over  50% of that time  was spent face-to-face on counseling and coordination of care.   We discussed  patient’s prognosis, treatment options available for m

## 2017-08-29 NOTE — TELEPHONE ENCOUNTER
Date of Treatment: 8/28/17                               Type of Chemo: Herceptin/Perjeta    Comments: LM general message for patient on home number. Recommendations: Requested patient call back with any issues or questions relating to treatment.  Confir

## 2017-09-18 NOTE — PROGRESS NOTES
[facility] Progress Note      Patient Name:  Nikole Valencia  YOB: 1945  Medical Record Number:  [id number]    Date of visit: 9/18/2017    CHIEF COMPLAINT: Metastatic HER-2 positive breast carcinoma.     HPI:      66year old that I hav lidocaine-prilocaine (EMLA) 2.5-2.5 % External Cream Apply to site 1 hour prior to port a cath needle insertion Disp: 1 Tube Rfl: 0       VITALS:  Height: --  Weight: 59.5 kg (131 lb 3.2 oz) (09/18 1050)  BSA (Calculated - sq m): --  Pulse: 67 (09/18 105 x10(3) uL   Lymphocyte Absolute 1.41 0.90 - 4.00 x10(3) uL   Monocyte Absolute 0.37 0.10 - 0.60 x10(3) uL   Eosinophil Absolute 0.14 0.00 - 0.30 x10(3) uL   Basophil Absolute 0.03 0.00 - 0.10 x10(3) uL   Immature Granulocyte Absolute 0.01 0.00 - 1.00 x10(3

## 2017-09-18 NOTE — PATIENT INSTRUCTIONS
To reach Dr Mo Perez nurse during business hours, please call [phone]. After hours, including weekends, evenings, and holidays, please call the main number [phone] for emergent needs.

## 2017-09-18 NOTE — PROGRESS NOTES
Education Record    Learner:  Patient    Disease / Summer Axon cancer    Barriers / Limitations:  None    Method:  Brief focused, printed material and  reinforcement    General Topics:  Plan of care reviewed    Outcome:  Shows understanding

## 2017-09-18 NOTE — PROGRESS NOTES
Pt here for MD f/u visit and due for cycle 2 chemotherapy for h/o breast ca. Energy and appetite good. Some nausea and loose stools for 5-6 days following 1st chemotherapy. Took Imodium with symptom relief. Denies pain. Denies fever/chills.      Education R

## 2017-10-09 NOTE — PATIENT INSTRUCTIONS
To reach Dr Colin Phillips nurse during business hours, please call [phone]. After hours, including weekends, evenings, and holidays, please call the main number [phone] for emergent needs.

## 2017-10-09 NOTE — PROGRESS NOTES
Pt here for MD f/u visit and due for Herceptin/Perjeta for h/o breast ca. Energy level and appetite fair. Occasional nausea the week following chemotherapy. Diarrhea, 1-2 times per day but not everyday. takes Imodium as needed with relief.      Education Re

## 2017-10-09 NOTE — PROGRESS NOTES
Education Record    Learner:  Patient    Disease / Rajat [place] cancer    Barriers / Limitations:  None    Method:  Brief focused, printed material and  reinforcement    General Topics:  Plan of care reviewed    Outcome:  Shows understanding

## 2017-10-09 NOTE — PROGRESS NOTES
[facility] Progress Note      Patient Name:  Terral Angelucci  YOB: 1945  Medical Record Number:  [id number]    Date of visit: 10/9/2017    CHIEF COMPLAINT: Metastatic HER-2 positive breast carcinoma.     HPI:      66year old that I hav Blocker. Disp: 90 tablet Rfl: 0   Ondansetron HCl (ZOFRAN) 8 MG tablet Take 1 tablet (8 mg total) by mouth every 8 (eight) hours as needed for Nausea.  Disp: 30 tablet Rfl: 3   lidocaine-prilocaine (EMLA) 2.5-2.5 % External Cream Apply to site 1 hour prior MCHC 33.1 31.0 - 37.0 g/dL   RDW 12.4 11.5 - 16.0 %   RDW-SD 41.3 35.1 - 46.3 fL   Neutrophil Absolute Prelim 3.69 1.30 - 6.70 x10 (3) uL   Neutrophil Absolute 3.69 1.30 - 6.70 x10(3) uL   Lymphocyte Absolute 1.61 0.90 - 4.00 x10(3) uL   Monocyte Absolut

## 2017-10-30 NOTE — PROGRESS NOTES
[facility] Progress Note      Patient Name:  Kumar Torres  YOB: 1945  Medical Record Number:  [id number]    Date of visit: 10/30/2017    CHIEF COMPLAINT: Metastatic HER-2 positive breast carcinoma.     HPI:      66year old that I ha Beta Blocker. Disp: 90 tablet Rfl: 0   Ondansetron HCl (ZOFRAN) 8 MG tablet Take 1 tablet (8 mg total) by mouth every 8 (eight) hours as needed for Nausea.  Disp: 30 tablet Rfl: 3   lidocaine-prilocaine (EMLA) 2.5-2.5 % External Cream Apply to site 1 hour p 33.2 pg   MCHC 33.5 31.0 - 37.0 g/dL   RDW 12.6 11.5 - 16.0 %   RDW-SD 41.4 35.1 - 46.3 fL   Neutrophil Absolute Prelim 7.38 (H) 1.30 - 6.70 x10 (3) uL   Neutrophil Absolute 7.38 (H) 1.30 - 6.70 x10(3) uL   Lymphocyte Absolute 0.95 0.90 - 4.00 x10(3) uL MD Mary Grace,chemo. Nimisha Shook M.D.     [facility]    [address], [address]    10/30/2017

## 2017-10-30 NOTE — PATIENT INSTRUCTIONS
To reach Dr Bony andrade during business hours, please call [phone]. After hours, including weekends, evenings, and holidays, please call the main number [phone] for emergent needs.

## 2017-10-30 NOTE — PROGRESS NOTES
Pt here for MD f/u visit and due for Herceptin/Perjeta for h/o breast ca. Energy level and appetite fair. Occasional nausea the week following chemotherapy. Diarrhea, 1-2 times per day but not everyday. takes Imodium as needed with relief.  Reports nasal

## 2017-10-30 NOTE — PROGRESS NOTES
Education Record    Learner:  Patient    Disease / Mort Haz[place] cancer    Barriers / Limitations:  None    Method:  Brief focused, printed material and  reinforcement    General Topics:  Plan of care reviewed    Outcome:  Shows understanding

## 2017-11-20 NOTE — PROGRESS NOTES
Pt here for MD f/u visit and due for herceptin/perjeta cycle 5 for breast ca. Pt reports intermittent fevers for that past 2-3 weeks, 99.9- 100.9 this past Friday. +sore throat. Head and chest congestion. No cough. No pain.  Pt states \"she hasnt felt well

## 2017-11-20 NOTE — PROGRESS NOTES
[facility] Progress Note      Patient Name:  Nikole Valencia  YOB: 1945  Medical Record Number:  [id number]    Date of visit: 11/20/2017    CHIEF COMPLAINT: Metastatic HER-2 positive breast carcinoma.     HPI:      74year old that I ha Metoprolol Succinate ER 25 MG Oral Tablet 24 Hr Take 1 tablet (25 mg total) by mouth Daily Beta Blocker. Disp: 90 tablet Rfl: 0   Ondansetron HCl (ZOFRAN) 8 MG tablet Take 1 tablet (8 mg total) by mouth every 8 (eight) hours as needed for Nausea.  Disp: 3 CONTRAST USED:  68 cc used cc of Omnipaque 350     FINDINGS:    LIVER:  There has been continued interval increase in size of the dominant lesion within the left hepatic lobe. This measures up to 5.0 x 4.7 cm. This previously measured 3.7 x 4.8 cm.  Sever Result Value Ref Range   Glucose 92 70 - 99 mg/dL   BUN 11 8 - 20 mg/dL   Creatinine 0.69 0.55 - 1.02 mg/dL   GFR 86 >=60   Calcium, Total 8.6 8.3 - 10.3 mg/dL   Alkaline Phosphatase 106 55 - 142 U/L   AST 19 15 - 41 U/L   Alt 21 14 - 54 U/L   Bilirubin, She started dual antibody blockade with Pertuzumab/trastuzumab on 8/28/17. Given the indolent nature of the disease, I elected to hold off on chemotherapy. Unfortunately, tumor markers have been rising.   She did a CT scan recently which unfortunately erickson

## 2017-11-22 NOTE — TELEPHONE ENCOUNTER
Patient called stating she has decided to go with the treatment that she would not lose her hair. Per MD not it looks like the plan would be, \"continue trastuzumab and start a hair sparing regimen like carboplatin/gemcitabine + her 2 ab\".  Routed to MD/MYRTLE

## 2017-11-26 NOTE — PROGRESS NOTES
[facility] Progress Note      Patient Name:  Alfredo Snowden  YOB: 1945  Medical Record Number:  [id number]    Date of visit: 11/27/2017    CHIEF COMPLAINT: Metastatic HER-2 positive breast carcinoma.     HPI:      74year old that I ha Rfl:    Metoprolol Succinate ER 25 MG Oral Tablet 24 Hr Take 1 tablet (25 mg total) by mouth Daily Beta Blocker. Disp: 90 tablet Rfl: 0   Ondansetron HCl (ZOFRAN) 8 MG tablet Take 1 tablet (8 mg total) by mouth every 8 (eight) hours as needed for Nausea.  D 50.0 %   .0 150.0 - 450.0 10(3)uL   MCV 90.7 81.0 - 100.0 fL   MCH 29.8 27.0 - 33.2 pg   MCHC 32.9 31.0 - 37.0 g/dL   RDW 12.6 11.5 - 16.0 %   RDW-SD 41.3 35.1 - 46.3 fL   Neutrophil Absolute Prelim 4.50 1.30 - 6.70 x10 (3) uL   Neutrophil Absolute every 3 weeks for quality of life purposes. Discussed again incurable nature of disease and balance between response and quality of life. ORDERS PLACED:      Return in about 1 week (around 12/4/2017) for Labs,MD,chemo. Lior Keyes M.D.     Dominique Gill

## 2017-11-27 NOTE — PROGRESS NOTES
Pt here for MD f/u visit and due for chemotherapy for breast ca. Pt continues to have sore, irritated throat. Nasal drainage and dry cough. Has 3 days left of abx. Denies fever/chills. No pain.   Education Record    Learner:  Patient    Disease / Diagnosis:

## 2017-11-27 NOTE — PROGRESS NOTES
IV Chemotherapy Education    Timmy Macedo Sera    Date:  11/27/17  Diagnosis: metastatic breast cancer  Caregivers present:     Drug names:  Carboplatin, Gemzar    Treatment Effects on Bone Marrow:  Chemotherapy action on cancer / normal cells}  Fu

## 2017-11-27 NOTE — PATIENT INSTRUCTIONS
To reach Dr Reggie Garduno nurse during business hours, please call [phone]. After hours, including weekends, evenings, and holidays, please call the main number [phone] for emergent needs.

## 2017-11-27 NOTE — PROGRESS NOTES
Education Record    Learner:  Patient    Disease / Viveca Rashaun cancer    Barriers / Limitations:  None    Method:  Brief focused, printed material and  reinforcement    General Topics:  Plan of care reviewed    Outcome:  Shows understanding

## 2017-11-28 NOTE — TELEPHONE ENCOUNTER
Date of Treatment: 11/27/17                               Type of Chemo: Carbo/[place]    Comments: LM for patient on home number. Encouraged patient to call with issues or questions relating to treatment. Future appointments scheduled.

## 2017-12-04 NOTE — PROGRESS NOTES
[facility] Progress Note      Patient Name:  Brittany Shin  YOB: 1945  Medical Record Number:  [id number]    Date of visit: 12/4/2017    CHIEF COMPLAINT: Metastatic HER-2 positive breast carcinoma.     HPI:      74year old that I hav every 6 (six) hours as needed for Nausea. Disp: 40 tablet Rfl: 3   Loperamide HCl (IMODIUM A-D) 2 MG Oral Tab Take 2 mg by mouth 4 (four) times daily as needed for Diarrhea.  Disp:  Rfl:    Metoprolol Succinate ER 25 MG Oral Tablet 24 Hr Take 1 tablet (25 m mmol/L   Potassium 3.6 3.6 - 5.1 mmol/L   Chloride 109 101 - 111 mmol/L   CO2 27.0 22.0 - 32.0 mmol/L   -CBC W/ DIFFERENTIAL   Collection Time: 12/04/17 10:25 AM   Result Value Ref Range   WBC 2.9 (L) 4.0 - 13.0 x10(3) uL   RBC 3.53 (L) 3.80 - 5.10 x10(6)u with gemcitabine. We will proceed with gemcitabine/carboplatin today. Discussed that she will have more fatigue and nausea. Neulasta will be given. Will plan to start restage after 3 cycles of chemotherapy.   Plan to complete 6 cycles and see if we can

## 2017-12-04 NOTE — PATIENT INSTRUCTIONS
To reach Dr Hans andrade during business hours, please call [phone]. After hours, including weekends, evenings, and holidays, please call the main number [phone] for emergent needs.

## 2017-12-04 NOTE — PROGRESS NOTES
Pt here for MD f/u visit and due for C1D8 chemotherapy for breast ca. Energy level and appetite fair. Reports nausea 1-2 days after chemotherapy, took zofran once. Some diarrhea. Denies fever/chills. Denies pain.      Education Record    Learner:  Patient

## 2017-12-04 NOTE — PROGRESS NOTES
Education Record    Learner:  Patient    Disease / Shelby Saeed cancer    Barriers / Limitations:  None    Method:  Brief focused, printed material and  reinforcement    General Topics:  Plan of care reviewed    Outcome:  Shows understanding

## 2017-12-18 NOTE — PROGRESS NOTES
Pt here for MD f/u visit and due for cycle 2 chemotherapy for breast ca. Energy level and appetite fair. Reports nausea and decrease appetite the week following chemotherapy, takes zofran but causes pt constipation. Low grade temp 99.1-100.  Denies cough, s

## 2017-12-18 NOTE — PROGRESS NOTES
Education Record  Learner:  Patient  Disease / Diagnosis:   Metastatic breast cancer  Barriers / Limitations:  None  Method:  Printed material and Reinforcement  General Topics:  Plan of care reviewed; side effects  Outcome:  Shows understanding and Patien

## 2017-12-26 NOTE — PROGRESS NOTES
ANP Visit Note    Patient Name: Brittany Shin   YOB: 1945   Medical Record Number: [id number]   CSN: [id number]   Date of visit: 12/26/2017       Chief Complaint/Reason for Visit:  Metastatic breast cancer, C2D8 chemotherapy   Oncologic History: Essential hypertension     Vitamin D deficiency     PVC (premature ventricular contraction)       Medical History:  Past Medical History:   Diagnosis Date   • Anemia    • Anesthesia complication    • Arrhythmia    • Cancer ([facility] [address] 2015    left breast   • Dis Succinate ER 25 MG Oral Tablet 24 Hr, Take 1 tablet (25 mg total) by mouth Daily Beta Blocker. , Disp: 90 tablet, Rfl: 0  •  Ondansetron HCl (ZOFRAN) 8 MG tablet, Take 1 tablet (8 mg total) by mouth every 8 (eight) hours as needed for Nausea., Disp: 30 tabl 26.0 22.0 - 32.0 mmol/L   -CBC W/ DIFFERENTIAL   Collection Time: 12/26/17 10:32 AM   Result Value Ref Range   WBC 10.1 4.0 - 13.0 x10(3) uL   RBC 3.04 (L) 3.80 - 5.10 x10(6)uL   HGB 9.1 (L) 12.0 - 16.0 g/dL   HCT 27.8 (L) 34.0 - 50.0 %   .0 150.0 -

## 2017-12-29 NOTE — PROGRESS NOTES
Pt feeling better after fluids and Zofran. Urinated. Educated pt how to take antinausea meds and on proper hydration.

## 2017-12-29 NOTE — PROGRESS NOTES
[facility] Progress Note    Patient Name: Nikole Valencia   YOB: 1945   Medical Record Number: [id number]   CSN: [id number]   Date of visit: 12/29/2017   Provider: JÚNIOR Adam  Referring Physician: No ref.  provider found    Problem L Right arm   Patient Position Sitting   Temp 100.1 °F (37.8 °C)   Pain Score 0       Medications:    Current Outpatient Prescriptions:   •  Prochlorperazine Maleate (COMPAZINE) 10 mg tablet, TAKE 1 TABLET BY MOUTH EVERY 6 HOURS AS NEEDED FOR NAUSEA, Disp: 3 Sodium 137 136 - 144 mmol/L   Potassium 3.6 3.6 - 5.1 mmol/L   Chloride 102 101 - 111 mmol/L   CO2 26.0 22.0 - 32.0 mmol/L   -CBC W/ DIFFERENTIAL   Collection Time: 12/29/17 12:20 PM   Result Value Ref Range   WBC 49.8 (H) 4.0 - 13.0 x10(3) uL   RBC 3.32

## 2017-12-29 NOTE — PROGRESS NOTES
Pt c/o weak, c/o diarrhea X4 yesterday no mucus or blood. None today. Temp 100.1. No cough. Taking only sips of water.

## 2018-01-01 ENCOUNTER — TELEPHONE (OUTPATIENT)
Dept: HEMATOLOGY/ONCOLOGY | Facility: HOSPITAL | Age: 73
End: 2018-01-01

## 2018-01-01 ENCOUNTER — TELEPHONE (OUTPATIENT)
Dept: CASE MANAGEMENT | Age: 73
End: 2018-01-01

## 2018-01-01 ENCOUNTER — OFFICE VISIT (OUTPATIENT)
Dept: HEMATOLOGY/ONCOLOGY | Age: 73
End: 2018-01-01
Attending: INTERNAL MEDICINE
Payer: MEDICARE

## 2018-01-01 ENCOUNTER — PATIENT OUTREACH (OUTPATIENT)
Dept: CASE MANAGEMENT | Age: 73
End: 2018-01-01

## 2018-01-01 ENCOUNTER — HOSPITAL ENCOUNTER (INPATIENT)
Facility: HOSPITAL | Age: 73
LOS: 2 days | Discharge: HOME | DRG: 872 | End: 2018-01-01
Attending: EMERGENCY MEDICINE | Admitting: INTERNAL MEDICINE
Payer: MEDICARE

## 2018-01-01 ENCOUNTER — APPOINTMENT (OUTPATIENT)
Dept: GENERAL RADIOLOGY | Facility: HOSPITAL | Age: 73
DRG: 872 | End: 2018-01-01
Attending: EMERGENCY MEDICINE
Payer: MEDICARE

## 2018-01-01 ENCOUNTER — APPOINTMENT (OUTPATIENT)
Dept: HEMATOLOGY/ONCOLOGY | Age: 73
End: 2018-01-01
Attending: INTERNAL MEDICINE
Payer: MEDICARE

## 2018-01-01 VITALS
RESPIRATION RATE: 18 BRPM | BODY MASS INDEX: 20 KG/M2 | OXYGEN SATURATION: 97 % | TEMPERATURE: 98 F | HEART RATE: 83 BPM | DIASTOLIC BLOOD PRESSURE: 84 MMHG | SYSTOLIC BLOOD PRESSURE: 137 MMHG | WEIGHT: 122.19 LBS

## 2018-01-01 VITALS
HEART RATE: 94 BPM | SYSTOLIC BLOOD PRESSURE: 134 MMHG | TEMPERATURE: 100 F | RESPIRATION RATE: 20 BRPM | DIASTOLIC BLOOD PRESSURE: 72 MMHG | OXYGEN SATURATION: 97 %

## 2018-01-01 VITALS
HEIGHT: 65 IN | BODY MASS INDEX: 19.99 KG/M2 | HEART RATE: 67 BPM | DIASTOLIC BLOOD PRESSURE: 73 MMHG | SYSTOLIC BLOOD PRESSURE: 131 MMHG | WEIGHT: 120 LBS | RESPIRATION RATE: 18 BRPM | TEMPERATURE: 99 F | OXYGEN SATURATION: 95 %

## 2018-01-01 VITALS
TEMPERATURE: 99 F | SYSTOLIC BLOOD PRESSURE: 124 MMHG | HEART RATE: 85 BPM | OXYGEN SATURATION: 97 % | DIASTOLIC BLOOD PRESSURE: 70 MMHG | RESPIRATION RATE: 18 BRPM

## 2018-01-01 DIAGNOSIS — R50.9 FEVER, UNKNOWN ORIGIN: ICD-10-CM

## 2018-01-01 DIAGNOSIS — R50.9 FEVER AND CHILLS: ICD-10-CM

## 2018-01-01 DIAGNOSIS — Z17.0 MALIGNANT NEOPLASM OF CENTRAL PORTION OF LEFT BREAST IN FEMALE, ESTROGEN RECEPTOR POSITIVE (HCC): ICD-10-CM

## 2018-01-01 DIAGNOSIS — C78.7 METASTATIC CANCER TO LIVER (HCC): ICD-10-CM

## 2018-01-01 DIAGNOSIS — E86.0 DEHYDRATION: ICD-10-CM

## 2018-01-01 DIAGNOSIS — A41.51 SEPSIS DUE TO ESCHERICHIA COLI (HCC): Primary | ICD-10-CM

## 2018-01-01 DIAGNOSIS — C50.112 MALIGNANT NEOPLASM OF CENTRAL PORTION OF LEFT BREAST IN FEMALE, ESTROGEN RECEPTOR POSITIVE (HCC): ICD-10-CM

## 2018-01-01 DIAGNOSIS — C77.3 METASTATIC CANCER TO AXILLARY LYMPH NODES (HCC): ICD-10-CM

## 2018-01-01 DIAGNOSIS — N30.00 ACUTE CYSTITIS WITHOUT HEMATURIA: ICD-10-CM

## 2018-01-01 DIAGNOSIS — C78.7 METASTATIC CANCER TO LIVER (HCC): Primary | ICD-10-CM

## 2018-01-01 DIAGNOSIS — Z02.9 ENCOUNTERS FOR ADMINISTRATIVE PURPOSE: ICD-10-CM

## 2018-01-01 DIAGNOSIS — B95.2 UTI (URINARY TRACT INFECTION) DUE TO ENTEROCOCCUS: ICD-10-CM

## 2018-01-01 DIAGNOSIS — E87.6 HYPOKALEMIA: ICD-10-CM

## 2018-01-01 DIAGNOSIS — N39.0 UTI (URINARY TRACT INFECTION) DUE TO ENTEROCOCCUS: ICD-10-CM

## 2018-01-01 DIAGNOSIS — R50.9 FEVER, UNKNOWN ORIGIN: Primary | ICD-10-CM

## 2018-01-01 DIAGNOSIS — C50.919 METASTATIC BREAST CANCER (HCC): ICD-10-CM

## 2018-01-01 DIAGNOSIS — Z17.0 MALIGNANT NEOPLASM OF CENTRAL PORTION OF LEFT BREAST IN FEMALE, ESTROGEN RECEPTOR POSITIVE (HCC): Primary | ICD-10-CM

## 2018-01-01 DIAGNOSIS — C50.112 MALIGNANT NEOPLASM OF CENTRAL PORTION OF LEFT BREAST IN FEMALE, ESTROGEN RECEPTOR POSITIVE (HCC): Primary | ICD-10-CM

## 2018-01-01 DIAGNOSIS — C78.7 LIVER METASTASES (HCC): ICD-10-CM

## 2018-01-01 DIAGNOSIS — R78.81 BACTEREMIA: Primary | ICD-10-CM

## 2018-01-01 LAB
ALBUMIN SERPL-MCNC: 2.9 G/DL (ref 3.5–4.8)
ALBUMIN SERPL-MCNC: 3.1 G/DL (ref 3.5–4.8)
ALBUMIN SERPL-MCNC: 3.1 G/DL (ref 3.5–4.8)
ALP LIVER SERPL-CCNC: 141 U/L (ref 55–142)
ALP LIVER SERPL-CCNC: 144 U/L (ref 55–142)
ALP LIVER SERPL-CCNC: 179 U/L (ref 55–142)
ALT SERPL-CCNC: 48 U/L (ref 14–54)
ALT SERPL-CCNC: 61 U/L (ref 14–54)
ALT SERPL-CCNC: 94 U/L (ref 14–54)
ANTIBODY SCREEN: NEGATIVE
AST SERPL-CCNC: 36 U/L (ref 15–41)
AST SERPL-CCNC: 41 U/L (ref 15–41)
AST SERPL-CCNC: 53 U/L (ref 15–41)
BASOPHILS # BLD AUTO: 0.02 X10(3) UL (ref 0–0.1)
BASOPHILS # BLD AUTO: 0.03 X10(3) UL (ref 0–0.1)
BASOPHILS # BLD AUTO: 0.03 X10(3) UL (ref 0–0.1)
BASOPHILS # BLD AUTO: 0.04 X10(3) UL (ref 0–0.1)
BASOPHILS NFR BLD AUTO: 0.2 %
BILIRUB SERPL-MCNC: 0.3 MG/DL (ref 0.1–2)
BILIRUB SERPL-MCNC: 0.4 MG/DL (ref 0.1–2)
BILIRUB SERPL-MCNC: 0.6 MG/DL (ref 0.1–2)
BILIRUB UR QL STRIP.AUTO: NEGATIVE
BILIRUB UR QL STRIP.AUTO: NEGATIVE
BLOOD TYPE BARCODE: 6200
BREAST CARCINOMA AG (CA2729): 66.2 U/ML (ref ?–38)
BUN BLD-MCNC: 12 MG/DL (ref 8–20)
BUN BLD-MCNC: 20 MG/DL (ref 8–20)
BUN BLD-MCNC: 22 MG/DL (ref 8–20)
BUN BLD-MCNC: 3 MG/DL (ref 8–20)
BUN BLD-MCNC: 8 MG/DL (ref 8–20)
CALCIUM BLD-MCNC: 8.1 MG/DL (ref 8.3–10.3)
CALCIUM BLD-MCNC: 8.3 MG/DL (ref 8.3–10.3)
CALCIUM BLD-MCNC: 8.4 MG/DL (ref 8.3–10.3)
CALCIUM BLD-MCNC: 8.7 MG/DL (ref 8.3–10.3)
CALCIUM BLD-MCNC: 8.8 MG/DL (ref 8.3–10.3)
CHLORIDE: 103 MMOL/L (ref 101–111)
CHLORIDE: 105 MMOL/L (ref 101–111)
CHLORIDE: 108 MMOL/L (ref 101–111)
CHLORIDE: 109 MMOL/L (ref 101–111)
CHLORIDE: 115 MMOL/L (ref 101–111)
CLARITY UR REFRACT.AUTO: CLEAR
CLARITY UR REFRACT.AUTO: CLEAR
CO2: 23 MMOL/L (ref 22–32)
CO2: 26 MMOL/L (ref 22–32)
CO2: 30 MMOL/L (ref 22–32)
COLOR UR AUTO: YELLOW
COLOR UR AUTO: YELLOW
CREAT BLD-MCNC: 0.77 MG/DL (ref 0.55–1.02)
CREAT BLD-MCNC: 0.99 MG/DL (ref 0.55–1.02)
CREAT BLD-MCNC: 1.08 MG/DL (ref 0.55–1.02)
CREAT BLD-MCNC: 1.15 MG/DL (ref 0.55–1.02)
CREAT BLD-MCNC: 1.22 MG/DL (ref 0.55–1.02)
EOSINOPHIL # BLD AUTO: 0.03 X10(3) UL (ref 0–0.3)
EOSINOPHIL # BLD AUTO: 0.04 X10(3) UL (ref 0–0.3)
EOSINOPHIL # BLD AUTO: 0.05 X10(3) UL (ref 0–0.3)
EOSINOPHIL # BLD AUTO: 0.13 X10(3) UL (ref 0–0.3)
EOSINOPHIL NFR BLD AUTO: 0.2 %
EOSINOPHIL NFR BLD AUTO: 0.3 %
EOSINOPHIL NFR BLD AUTO: 0.4 %
EOSINOPHIL NFR BLD AUTO: 0.9 %
ERYTHROCYTE [DISTWIDTH] IN BLOOD BY AUTOMATED COUNT: 13.9 % (ref 11.5–16)
ERYTHROCYTE [DISTWIDTH] IN BLOOD BY AUTOMATED COUNT: 14.1 % (ref 11.5–16)
ERYTHROCYTE [DISTWIDTH] IN BLOOD BY AUTOMATED COUNT: 14.3 % (ref 11.5–16)
ERYTHROCYTE [DISTWIDTH] IN BLOOD BY AUTOMATED COUNT: 15 % (ref 11.5–16)
ERYTHROCYTE [DISTWIDTH] IN BLOOD BY AUTOMATED COUNT: 15.3 % (ref 11.5–16)
GLUCOSE BLD-MCNC: 109 MG/DL (ref 70–99)
GLUCOSE BLD-MCNC: 82 MG/DL (ref 70–99)
GLUCOSE BLD-MCNC: 87 MG/DL (ref 70–99)
GLUCOSE BLD-MCNC: 93 MG/DL (ref 70–99)
GLUCOSE BLD-MCNC: 93 MG/DL (ref 70–99)
GLUCOSE UR STRIP.AUTO-MCNC: NEGATIVE MG/DL
GLUCOSE UR STRIP.AUTO-MCNC: NEGATIVE MG/DL
HAV IGM SER QL: 1.4 MG/DL (ref 1.7–3)
HAV IGM SER QL: 1.5 MG/DL (ref 1.7–3)
HAV IGM SER QL: 1.6 MG/DL (ref 1.7–3)
HCT VFR BLD AUTO: 20.6 % (ref 34–50)
HCT VFR BLD AUTO: 22.6 % (ref 34–50)
HCT VFR BLD AUTO: 23.2 % (ref 34–50)
HCT VFR BLD AUTO: 26.5 % (ref 34–50)
HCT VFR BLD AUTO: 29 % (ref 34–50)
HGB BLD-MCNC: 6.8 G/DL (ref 12–16)
HGB BLD-MCNC: 7.6 G/DL (ref 12–16)
HGB BLD-MCNC: 7.8 G/DL (ref 12–16)
HGB BLD-MCNC: 8.5 G/DL (ref 12–16)
HGB BLD-MCNC: 9 G/DL (ref 12–16)
HGB BLD-MCNC: 9.7 G/DL (ref 12–16)
IMMATURE GRANULOCYTE COUNT: 0.16 X10(3) UL (ref 0–1)
IMMATURE GRANULOCYTE COUNT: 0.18 X10(3) UL (ref 0–1)
IMMATURE GRANULOCYTE COUNT: 0.19 X10(3) UL (ref 0–1)
IMMATURE GRANULOCYTE COUNT: 0.2 X10(3) UL (ref 0–1)
IMMATURE GRANULOCYTE RATIO %: 1 %
IMMATURE GRANULOCYTE RATIO %: 1.3 %
IMMATURE GRANULOCYTE RATIO %: 1.3 %
IMMATURE GRANULOCYTE RATIO %: 1.4 %
KETONES UR STRIP.AUTO-MCNC: NEGATIVE MG/DL
LACTIC ACID: 0.7 MMOL/L (ref 0.5–2)
LACTIC ACID: 0.8 MMOL/L (ref 0.5–2)
LACTIC ACID: 0.8 MMOL/L (ref 0.5–2)
LACTIC ACID: 1 MMOL/L (ref 0.5–2)
LEUKOCYTE ESTERASE UR QL STRIP.AUTO: NEGATIVE
LYMPHOCYTES # BLD AUTO: 1.29 X10(3) UL (ref 0.9–4)
LYMPHOCYTES # BLD AUTO: 1.44 X10(3) UL (ref 0.9–4)
LYMPHOCYTES # BLD AUTO: 1.51 X10(3) UL (ref 0.9–4)
LYMPHOCYTES # BLD AUTO: 2.08 X10(3) UL (ref 0.9–4)
LYMPHOCYTES NFR BLD AUTO: 10.8 %
LYMPHOCYTES NFR BLD AUTO: 17.1 %
LYMPHOCYTES NFR BLD AUTO: 8 %
LYMPHOCYTES NFR BLD AUTO: 8.9 %
M PROTEIN MFR SERPL ELPH: 6.5 G/DL (ref 6.1–8.3)
M PROTEIN MFR SERPL ELPH: 7.1 G/DL (ref 6.1–8.3)
M PROTEIN MFR SERPL ELPH: 7.2 G/DL (ref 6.1–8.3)
MCH RBC QN AUTO: 29.8 PG (ref 27–33.2)
MCH RBC QN AUTO: 29.9 PG (ref 27–33.2)
MCH RBC QN AUTO: 30.1 PG (ref 27–33.2)
MCH RBC QN AUTO: 30.2 PG (ref 27–33.2)
MCH RBC QN AUTO: 30.6 PG (ref 27–33.2)
MCHC RBC AUTO-ENTMCNC: 33 G/DL (ref 31–37)
MCHC RBC AUTO-ENTMCNC: 33.4 G/DL (ref 31–37)
MCHC RBC AUTO-ENTMCNC: 33.6 G/DL (ref 31–37)
MCHC RBC AUTO-ENTMCNC: 33.6 G/DL (ref 31–37)
MCHC RBC AUTO-ENTMCNC: 34 G/DL (ref 31–37)
MCV RBC AUTO: 88.9 FL (ref 81–100)
MCV RBC AUTO: 89 FL (ref 81–100)
MCV RBC AUTO: 89 FL (ref 81–100)
MCV RBC AUTO: 89.6 FL (ref 81–100)
MCV RBC AUTO: 92.8 FL (ref 81–100)
MONOCYTES # BLD AUTO: 1 X10(3) UL (ref 0.1–0.6)
MONOCYTES # BLD AUTO: 1.03 X10(3) UL (ref 0.1–0.6)
MONOCYTES # BLD AUTO: 1.25 X10(3) UL (ref 0.1–0.6)
MONOCYTES # BLD AUTO: 1.35 X10(3) UL (ref 0.1–0.6)
MONOCYTES NFR BLD AUTO: 6.9 %
MONOCYTES NFR BLD AUTO: 7.1 %
MONOCYTES NFR BLD AUTO: 8.2 %
MONOCYTES NFR BLD AUTO: 9.6 %
NEUTROPHIL ABS PRELIM: 10.9 X10 (3) UL (ref 1.3–6.7)
NEUTROPHIL ABS PRELIM: 11.85 X10 (3) UL (ref 1.3–6.7)
NEUTROPHIL ABS PRELIM: 15.15 X10 (3) UL (ref 1.3–6.7)
NEUTROPHIL ABS PRELIM: 8.85 X10 (3) UL (ref 1.3–6.7)
NEUTROPHILS # BLD AUTO: 10.9 X10(3) UL (ref 1.3–6.7)
NEUTROPHILS # BLD AUTO: 11.85 X10(3) UL (ref 1.3–6.7)
NEUTROPHILS # BLD AUTO: 15.15 X10(3) UL (ref 1.3–6.7)
NEUTROPHILS # BLD AUTO: 8.85 X10(3) UL (ref 1.3–6.7)
NEUTROPHILS NFR BLD AUTO: 72.8 %
NEUTROPHILS NFR BLD AUTO: 77.7 %
NEUTROPHILS NFR BLD AUTO: 81.6 %
NEUTROPHILS NFR BLD AUTO: 83.7 %
NITRITE UR QL STRIP.AUTO: NEGATIVE
NITRITE UR QL STRIP.AUTO: POSITIVE
PH UR STRIP.AUTO: 5 [PH] (ref 4.5–8)
PH UR STRIP.AUTO: 5.5 [PH] (ref 4.5–8)
PLATELET # BLD AUTO: 119 10(3)UL (ref 150–450)
PLATELET # BLD AUTO: 124 10(3)UL (ref 150–450)
PLATELET # BLD AUTO: 129 10(3)UL (ref 150–450)
PLATELET # BLD AUTO: 130 10(3)UL (ref 150–450)
PLATELET # BLD AUTO: 188 10(3)UL (ref 150–450)
POTASSIUM SERPL-SCNC: 3 MMOL/L (ref 3.6–5.1)
POTASSIUM SERPL-SCNC: 3.2 MMOL/L (ref 3.6–5.1)
POTASSIUM SERPL-SCNC: 3.3 MMOL/L (ref 3.6–5.1)
POTASSIUM SERPL-SCNC: 3.3 MMOL/L (ref 3.6–5.1)
POTASSIUM SERPL-SCNC: 4 MMOL/L (ref 3.6–5.1)
POTASSIUM SERPL-SCNC: 4 MMOL/L (ref 3.6–5.1)
PROT UR STRIP.AUTO-MCNC: NEGATIVE MG/DL
RBC # BLD AUTO: 2.22 X10(6)UL (ref 3.8–5.1)
RBC # BLD AUTO: 2.54 X10(6)UL (ref 3.8–5.1)
RBC # BLD AUTO: 2.59 X10(6)UL (ref 3.8–5.1)
RBC # BLD AUTO: 2.98 X10(6)UL (ref 3.8–5.1)
RBC # BLD AUTO: 3.26 X10(6)UL (ref 3.8–5.1)
RED CELL DISTRIBUTION WIDTH-SD: 40.7 FL (ref 35.1–46.3)
RED CELL DISTRIBUTION WIDTH-SD: 42.1 FL (ref 35.1–46.3)
RED CELL DISTRIBUTION WIDTH-SD: 43.2 FL (ref 35.1–46.3)
RED CELL DISTRIBUTION WIDTH-SD: 43.5 FL (ref 35.1–46.3)
RED CELL DISTRIBUTION WIDTH-SD: 44.7 FL (ref 35.1–46.3)
RH BLOOD TYPE: POSITIVE
SODIUM SERPL-SCNC: 138 MMOL/L (ref 136–144)
SODIUM SERPL-SCNC: 142 MMOL/L (ref 136–144)
SODIUM SERPL-SCNC: 145 MMOL/L (ref 136–144)
SP GR UR STRIP.AUTO: 1 (ref 1–1.03)
SP GR UR STRIP.AUTO: 1.02 (ref 1–1.03)
UROBILINOGEN UR STRIP.AUTO-MCNC: 0.2 MG/DL
UROBILINOGEN UR STRIP.AUTO-MCNC: <2 MG/DL
WBC # BLD AUTO: 12.2 X10(3) UL (ref 4–13)
WBC # BLD AUTO: 13.5 X10(3) UL (ref 4–13)
WBC # BLD AUTO: 14 X10(3) UL (ref 4–13)
WBC # BLD AUTO: 14.5 X10(3) UL (ref 4–13)
WBC # BLD AUTO: 18.1 X10(3) UL (ref 4–13)

## 2018-01-01 PROCEDURE — 83605 ASSAY OF LACTIC ACID: CPT

## 2018-01-01 PROCEDURE — 87186 SC STD MICRODIL/AGAR DIL: CPT

## 2018-01-01 PROCEDURE — 99215 OFFICE O/P EST HI 40 MIN: CPT | Performed by: NURSE PRACTITIONER

## 2018-01-01 PROCEDURE — 99232 SBSQ HOSP IP/OBS MODERATE 35: CPT | Performed by: INTERNAL MEDICINE

## 2018-01-01 PROCEDURE — 80053 COMPREHEN METABOLIC PANEL: CPT

## 2018-01-01 PROCEDURE — 87040 BLOOD CULTURE FOR BACTERIA: CPT

## 2018-01-01 PROCEDURE — 81001 URINALYSIS AUTO W/SCOPE: CPT

## 2018-01-01 PROCEDURE — 85025 COMPLETE CBC W/AUTO DIFF WBC: CPT

## 2018-01-01 PROCEDURE — 30233N1 TRANSFUSION OF NONAUTOLOGOUS RED BLOOD CELLS INTO PERIPHERAL VEIN, PERCUTANEOUS APPROACH: ICD-10-PCS | Performed by: INTERNAL MEDICINE

## 2018-01-01 PROCEDURE — 86300 IMMUNOASSAY TUMOR CA 15-3: CPT

## 2018-01-01 PROCEDURE — 80048 BASIC METABOLIC PNL TOTAL CA: CPT

## 2018-01-01 PROCEDURE — 99214 OFFICE O/P EST MOD 30 MIN: CPT | Performed by: INTERNAL MEDICINE

## 2018-01-01 PROCEDURE — 99239 HOSP IP/OBS DSCHRG MGMT >30: CPT | Performed by: INTERNAL MEDICINE

## 2018-01-01 PROCEDURE — 96360 HYDRATION IV INFUSION INIT: CPT

## 2018-01-01 PROCEDURE — 99214 OFFICE O/P EST MOD 30 MIN: CPT | Performed by: CLINICAL NURSE SPECIALIST

## 2018-01-01 PROCEDURE — 99223 1ST HOSP IP/OBS HIGH 75: CPT | Performed by: INTERNAL MEDICINE

## 2018-01-01 PROCEDURE — 83735 ASSAY OF MAGNESIUM: CPT

## 2018-01-01 PROCEDURE — 71045 X-RAY EXAM CHEST 1 VIEW: CPT | Performed by: EMERGENCY MEDICINE

## 2018-01-01 PROCEDURE — 36415 COLL VENOUS BLD VENIPUNCTURE: CPT

## 2018-01-01 PROCEDURE — 87086 URINE CULTURE/COLONY COUNT: CPT

## 2018-01-01 PROCEDURE — 87077 CULTURE AEROBIC IDENTIFY: CPT

## 2018-01-01 PROCEDURE — 87088 URINE BACTERIA CULTURE: CPT

## 2018-01-01 RX ORDER — ONDANSETRON 2 MG/ML
8 INJECTION INTRAMUSCULAR; INTRAVENOUS ONCE
Status: CANCELLED
Start: 2018-01-01 | End: 2018-01-01

## 2018-01-01 RX ORDER — ONDANSETRON 2 MG/ML
8 INJECTION INTRAMUSCULAR; INTRAVENOUS ONCE
Status: COMPLETED | OUTPATIENT
Start: 2018-01-01 | End: 2018-01-01

## 2018-01-01 RX ORDER — MAGNESIUM OXIDE 400 MG (241.3 MG MAGNESIUM) TABLET
800 TABLET ONCE
Status: COMPLETED | OUTPATIENT
Start: 2018-01-01 | End: 2018-01-01

## 2018-01-01 RX ORDER — PROCHLORPERAZINE MALEATE 10 MG
10 TABLET ORAL EVERY 6 HOURS PRN
COMMUNITY

## 2018-01-01 RX ORDER — MAGNESIUM OXIDE 400 MG (241.3 MG MAGNESIUM) TABLET
800 TABLET ONCE
Status: DISCONTINUED | OUTPATIENT
Start: 2018-01-01 | End: 2018-01-01

## 2018-01-01 RX ORDER — LEVOFLOXACIN 5 MG/ML
500 INJECTION, SOLUTION INTRAVENOUS ONCE
Status: CANCELLED
Start: 2018-01-01 | End: 2018-01-01

## 2018-01-01 RX ORDER — LEVOFLOXACIN 5 MG/ML
500 INJECTION, SOLUTION INTRAVENOUS ONCE
Status: COMPLETED | OUTPATIENT
Start: 2018-01-01 | End: 2018-01-01

## 2018-01-01 RX ORDER — POTASSIUM CHLORIDE 20 MEQ/1
40 TABLET, EXTENDED RELEASE ORAL EVERY 4 HOURS
Status: COMPLETED | OUTPATIENT
Start: 2018-01-01 | End: 2018-01-01

## 2018-01-01 RX ORDER — SODIUM CHLORIDE 0.9 % (FLUSH) 0.9 %
10 SYRINGE (ML) INJECTION ONCE
Status: COMPLETED | OUTPATIENT
Start: 2018-01-01 | End: 2018-01-01

## 2018-01-01 RX ORDER — LEVOFLOXACIN 750 MG/1
750 TABLET ORAL DAILY
Qty: 10 TABLET | Refills: 0 | Status: SHIPPED | OUTPATIENT
Start: 2018-01-01

## 2018-01-01 RX ORDER — IBUPROFEN 200 MG
400 TABLET ORAL ONCE
Status: COMPLETED | OUTPATIENT
Start: 2018-01-01 | End: 2018-01-01

## 2018-01-01 RX ORDER — ONDANSETRON 2 MG/ML
4 INJECTION INTRAMUSCULAR; INTRAVENOUS EVERY 4 HOURS PRN
Status: DISCONTINUED | OUTPATIENT
Start: 2018-01-01 | End: 2018-01-01

## 2018-01-01 RX ORDER — SODIUM CHLORIDE 9 MG/ML
INJECTION, SOLUTION INTRAVENOUS CONTINUOUS
Status: DISCONTINUED | OUTPATIENT
Start: 2018-01-01 | End: 2018-01-01

## 2018-01-01 RX ORDER — BIOTIN 1 MG
1000 TABLET ORAL DAILY
Status: DISCONTINUED | OUTPATIENT
Start: 2018-01-01 | End: 2018-01-01

## 2018-01-01 RX ORDER — LOPERAMIDE HYDROCHLORIDE 2 MG/1
2 CAPSULE ORAL 4 TIMES DAILY PRN
Status: DISCONTINUED | OUTPATIENT
Start: 2018-01-01 | End: 2018-01-01

## 2018-01-01 RX ORDER — ONDANSETRON 2 MG/ML
4 INJECTION INTRAMUSCULAR; INTRAVENOUS EVERY 6 HOURS PRN
Status: DISCONTINUED | OUTPATIENT
Start: 2018-01-01 | End: 2018-01-01

## 2018-01-01 RX ORDER — METOPROLOL SUCCINATE 25 MG/1
25 TABLET, EXTENDED RELEASE ORAL
Status: DISCONTINUED | OUTPATIENT
Start: 2018-01-01 | End: 2018-01-01

## 2018-01-01 RX ORDER — ACETAMINOPHEN 325 MG/1
650 TABLET ORAL EVERY 6 HOURS PRN
Status: DISCONTINUED | OUTPATIENT
Start: 2018-01-01 | End: 2018-01-01

## 2018-01-01 RX ORDER — SODIUM CHLORIDE 9 MG/ML
INJECTION, SOLUTION INTRAVENOUS ONCE
Status: COMPLETED | OUTPATIENT
Start: 2018-01-01 | End: 2018-01-01

## 2018-01-01 RX ORDER — ENOXAPARIN SODIUM 100 MG/ML
40 INJECTION SUBCUTANEOUS DAILY
Status: DISCONTINUED | OUTPATIENT
Start: 2018-01-01 | End: 2018-01-01

## 2018-01-01 RX ORDER — BIOTIN 1 MG
1000 TABLET ORAL DAILY
COMMUNITY

## 2018-01-01 RX ORDER — POTASSIUM CHLORIDE 750 MG/1
40 TABLET, EXTENDED RELEASE ORAL ONCE
Status: COMPLETED | OUTPATIENT
Start: 2018-01-01 | End: 2018-01-01

## 2018-01-01 RX ADMIN — SODIUM CHLORIDE 0.9 % (FLUSH) 10 ML: 0.9 % SYRINGE (ML) INJECTION at 12:35:00

## 2018-01-01 RX ADMIN — SODIUM CHLORIDE 0.9 % (FLUSH) 10 ML: 0.9 % SYRINGE (ML) INJECTION at 15:30:00

## 2018-01-01 RX ADMIN — POTASSIUM CHLORIDE 40 MEQ: 750 TABLET, EXTENDED RELEASE ORAL at 15:57:00

## 2018-01-01 RX ADMIN — ONDANSETRON 8 MG: 2 INJECTION INTRAMUSCULAR; INTRAVENOUS at 12:12:00

## 2018-01-01 RX ADMIN — IBUPROFEN 400 MG: 200 MG TABLET ORAL at 16:11:00

## 2018-01-01 RX ADMIN — LEVOFLOXACIN 500 MG: 5 INJECTION, SOLUTION INTRAVENOUS at 12:15:00

## 2018-01-01 RX ADMIN — SODIUM CHLORIDE 0.9 % (FLUSH) 10 ML: 0.9 % SYRINGE (ML) INJECTION at 16:41:00

## 2018-01-03 NOTE — PROGRESS NOTES
Education Record    Learner:  Patient and spouse     Disease / Suhail bello    Barriers / Limitations:  None   Comments:    Method:  Brief focused and Printed material   Comments:    General Topics:  Medication, Side effects and symptom management a

## 2018-01-03 NOTE — PATIENT INSTRUCTIONS
-Start Levaquin (750mg tablet) this evening. Then continue taking once daily for 9 additional days.   -Push fluids  -Monitor temperature, call with any fevers 100.4 or greater.    -RTC tomorrow for IVF and lab draw    For Dr. Felicita Severe nurse line, call  [phone]

## 2018-01-03 NOTE — PROGRESS NOTES
ANP Visit Note    Patient Name: Jimmy Romero   YOB: 1945   Medical Record Number: [id number]   CSN: [id number]   Date of visit: 1/3/2018       Chief Complaint/Reason for Visit: Follow up /fever/ Metastatic breast cancer        History of Cinthia Comment: Procedure: COLONOSCOPY;  Surgeon: Valentino Gens, MD;  Location: [address] [place] [address] ENDOSCOPY  No date: EYE SURGERY Left  No date: MASTECTOMY LEFT  7 years ago: WRIST FRACTURE SURGERY Right      Comment: w hardware    Allergies:  No Known Allergi Patient is alert and oriented x 3, not in acute distress. Vital Signs: /74 (BP Location: Right arm, Patient Position: Sitting, Cuff Size: adult)   Pulse 112   Temp 101.1 °F (38.4 °C) (Tympanic)   Resp 18   SpO2 97%   HEENT: EOMs intact. PERRL.  Oroph Date: 12/29/2017  Value: 130*        Ref range: 14 - 54 U/L        Status: Final  Bilirubin, Total                              Date: 12/29/2017  Value: 0.7         Ref range: 0.1 - 2.0 mg/dL    Status: Final  Total Protein 12/29/2017  Value: 32.8        Ref range: 31.0 - 37.0 g/dL   Status: Final  RDW                                           Date: 12/29/2017  Value: 14.0        Ref range: 11.5 - 16.0 %      Status: Final  RDW-SD                                        Date: Status: Final  RBC Morphology                                Date: 12/29/2017  Value: Normal      Ref range: Normal             Status: Final  Platelet Morphology                           Date: 12/29/2017  Value: Normal      Ref range: Normal Final  WBC Urine                                     Date: 12/29/2017  Value: 21-50*      Ref range: <5 /HPF            Status: Final  RBC URINE                                     Date: 12/29/2017  Value: 3-5*        Ref range: 0 - 2 /HPF         Status: FNP-BC  Nurse Practitioner  [facility]

## 2018-01-04 NOTE — PROGRESS NOTES
Reviewed labs with JÚNIOR Diaz. Order for K rider today. Give mag and K tomorrow in 1L as planned. Pt and spouse updated.

## 2018-01-04 NOTE — PROGRESS NOTES
Education Record    Learner:  Patient, spouse    Disease / Diagnosis: breast ca, dehydration, hypomag, hypokalemia    Barriers / Limitations:  None   Comments:    Method:  Discussion   Comments:    General Topics:  Medication, Side effects and symptom mary

## 2018-01-04 NOTE — PROGRESS NOTES
Pt here for CLL, APN assessment and IVF. Pt dizzy upon standing and nauseated. Took PO levaquin yesterday. Per Tarik Arzola, APN given dose IV today and tomorrow. Will arrange for assessment and ivf tomorrow with abx.

## 2018-01-04 NOTE — PROGRESS NOTES
ANP Visit Note    Patient Name: Ryan Sanchez   YOB: 1945   Medical Record Number: [id number]   CSN: [id number]   Date of visit: 1/4/2018       Chief Complaint/Reason for Visit:  Metastatic Breast Cancer, Dehydration, Hypokalemia     Oncologic H Procedure: COLONOSCOPY;  Surgeon: Keisha Santoyo MD;  Location: [address] [place] [address] ENDOSCOPY  No date: EYE SURGERY Left  No date: MASTECTOMY LEFT  7 years ago: WRIST FRACTURE SURGERY Right      Comment: w hardware    Allergies:  No Known Allergies    Fam alert and oriented x 3, not in acute distress. Vital Signs: Oncology Vitals 1/4/2018   Height    Height    Weight    Weight    BSA (m2)    /72   Pulse 94   Resp 20   Temp 99.6   SpO2 97   Pain Score 0     HEENT: EOMs intact. PERRL.  Oropharynx is c Status: Final  Urobilinogen Urine                            Date: 01/03/2018  Value: 0.2         Ref range: 0.2 - 2.0 mg/dL    Status: Final  Nitrite Urine                                 Date: 01/03/2018  Value: Positive*   Ref range: Negative Date: 01/03/2018  Value: 3.0*        Ref range: 3.6 - 5.1 mmol/L   Status: Final  Chloride                                      Date: 01/03/2018  Value: 103         Ref range: 101 - 111 mmol/L   Status: Final  CO2 Date: 01/03/2018  Value: 1.44        Ref range: 0.90 - 4.00 x10(*  Status: Final  Monocyte Absolute                             Date: 01/03/2018  Value: 1.25*       Ref range: 0.10 - 0.60 x10(*  Status: Final  Eosinophil Absolute Ref range: None Seen          Status: Final  Squamous Epi.  Cells                           Date: 01/03/2018  Value: None Seen   Ref range: Small /LPF         Status: Final  Renal Tubular Epithelial Cells                Date: 01/03/2018  Value: None

## 2018-01-05 PROBLEM — D69.6 THROMBOCYTOPENIA (HCC): Status: ACTIVE | Noted: 2018-01-01

## 2018-01-05 PROBLEM — C50.919 METASTATIC BREAST CANCER: Status: ACTIVE | Noted: 2018-01-01

## 2018-01-05 PROBLEM — C50.919 METASTATIC BREAST CANCER (HCC): Status: ACTIVE | Noted: 2018-01-01

## 2018-01-05 PROBLEM — R78.81 BACTEREMIA: Status: ACTIVE | Noted: 2018-01-01

## 2018-01-05 PROBLEM — A41.51 SEPSIS DUE TO ESCHERICHIA COLI (HCC): Status: ACTIVE | Noted: 2018-01-01

## 2018-01-05 PROBLEM — N30.00 ACUTE CYSTITIS WITHOUT HEMATURIA: Status: ACTIVE | Noted: 2018-01-01

## 2018-01-05 NOTE — CONSULTS
Hematology-Oncology Consultation Note    Patient Name: Alfredo Snowden   YOB: 1945   Medical Record Number: [id number]   CSN: [id number]   Consulting Physician: Lilly Faulkner MD  Date of Consultation: 1/5/2018     Reason for Consultation:  Emilie Mcgee QT   • Diabetes Mother    • Diabetes Sister    • Diabetes Brother        Psychosocial History:    Social History  Social History   Marital status:   Spouse name: N/A    Years of education: N/A  Number of children: N/A     Occupational History  None acute distress. HEENT: EOMs intact. PERRL. Oropharynx is clear. Neck: No JVD. No palpable lymphadenopathy. Neck is supple. Chest: Clear to auscultation. Port right side looks ok, no erythema or drainage  Heart: Regular rate and rhythm.    Abdomen: Soft, 8.3 01/03/2018 1457    Albumin 2.9 (L) 01/05/2018 1121    Albumin 3.1 (L) 01/03/2018 1457    Albumin 3.2 (L) 12/29/2017 1220    Sodium 142 01/05/2018 1121    Sodium 142 01/04/2018 1133    Sodium 138 01/03/2018 1457    Potassium 3.3 (L) 01/05/2018 1121    P adjacent rounded 1 cm densities in the left upper lobe laterally adjacent to the EKG lead which may be related to the lead with underlying pulmonary nodules not excluded.     =====  CONCLUSION:  No evidence of pneumonia.  There are adjacent rounded 1 cm den

## 2018-01-05 NOTE — H&P
[facility]  History and Physical     Petra Cranker Patient Status:  Inpatient    11/15/1945 MRN [id number]   [facility] 4NW-A Attending Julee Buchanan MD   Hosp Day # 0 PCP Keshia Latif DO     Chief Complaint: fever     History o facility-administered medications on file prior to encounter. Current Outpatient Prescriptions on File Prior to Encounter:  levofloxacin 750 MG Oral Tab Take 1 tablet (750 mg total) by mouth daily.  Disp: 10 tablet Rfl: 0   Loperamide HCl (IMODIUM A-D) 2 88.9  89.0   PLT  130.0*  124.0*       Recent Labs   Lab  01/03/18   1457  01/04/18   1133  01/05/18   1121   GLU  93  87  93   BUN  22*  20  12   CREATSERUM  1.22*  1.15*  1.08*   CA  8.3  8.4  8.8   ALB  3.1*   --   2.9*   NA  138  142  142   K  3.0*  3.

## 2018-01-05 NOTE — ED INITIAL ASSESSMENT (HPI)
Pt says she has been sick since the beginning of November with dizziness and nausea. Undergoing treatment for breast CA. Currently here for positive blood culture.

## 2018-01-05 NOTE — TELEPHONE ENCOUNTER
Paged by [facility] reporting bld cx turned positive for GNR- PCR consistent with ecoli from port specimen drawn 1/3. I called both patient phone numbers listed, but no answer. I left VM message for her to call back or go to ED for admission and IV abx.

## 2018-01-05 NOTE — TELEPHONE ENCOUNTER
[facility] has positive blood cultures. Pt will be admitted, go to [facility]. Spoke with pt's . Aware of results and plan to proceed to [facility]  for admission.  Dr. Dm Martinez will be contacted once she arrives for admis

## 2018-01-05 NOTE — PROGRESS NOTES
NURSING ADMISSION NOTE      Patient admitted via Cart  Oriented to room. Safety precautions initiated. Bed in low position. Call light in reach. Pt AOx4. No c/o pain. IVF started. VSS. Afebrile. Repeat blood cx taken in ER.

## 2018-01-05 NOTE — ED PROVIDER NOTES
Patient Seen in: [facility] Emergency Department    History   Patient presents with:  Abnormal Result (metabolic, cardiac)    Stated Complaint: pos blood cultures     HPI    Patient presents with positive blood cultures.   The patient has been having Packs/day: 0.00      Years: 0.00      Smokeless tobacco: Never Used                      Comment: quit 45 years ago  Alcohol use:  No                Review of Systems    Positive for stated complaint: pos blood cultures   Other systems Absolute 10.90 (*)     Monocyte Absolute 1.35 (*)     All other components within normal limits   LACTIC ACID, PLASMA - Normal   CBC WITH DIFFERENTIAL WITH PLATELET    Narrative:      The following orders were created for panel order CBC WITH DIFFERENTIAL W radiograph with removal of the cardiac lead would be helpful for further evaluation.     Dictated by: Bedelia Claude, MD on 1/05/2018 at 12:03     Approved by: Bedelia Claude, MD          ED Course as of Jan 05 1524  ----------------------------------

## 2018-01-05 NOTE — PROGRESS NOTES
[facility] Pharmacy Note:  Renal Adjustment for meropenem (MERREM)    Parag Nj is a 74year old female who has been prescribed meropenem (MERREM) 500 mg every 8 hrs over 3 hours.   CrCl is estimated creatinine clearance is 39.2 mL/min (based on SCr of 1.08 mg/dL

## 2018-01-06 NOTE — PROGRESS NOTES
Alert and oriented x 4, v.s.s.,continues on antibiotics and IVF, ambulatory in room with standby assist, no c/o pain or signs of distress, will continue to monitor.

## 2018-01-06 NOTE — PLAN OF CARE
GASTROINTESTINAL - ADULT    • Maintains or returns to baseline bowel function Not Progressing          HEMATOLOGIC - ADULT    • Maintains hematologic stability Progressing        METABOLIC/FLUID AND ELECTROLYTES - ADULT    • Hemodynamic stability and optim

## 2018-01-06 NOTE — PROGRESS NOTES
[facility]  Progress Note     Cherie Watts Patient Status:  Inpatient    11/15/1945 MRN [id number]   [facility] 4NW-A Attending Mari Reyna MD   Hosp Day # 1 PCP Galdino Blackmon DO     Chief Complaint: soft stool  S: Patient Benitokian Lauren reviewed in Epic. Medications:   • Metoprolol Succinate ER  25 mg Oral Daily Beta Blocker   • enoxaparin  40 mg Subcutaneous Daily   • meropenem  500 mg Intravenous Q12H       ASSESSMENT / PLAN:     1.  Sepsis due to [place] likely from urosepsis   1.  minesh

## 2018-01-06 NOTE — DIETARY MALNUTRITION NOTE
[facility]    NUTRITION INITIAL ASSESSMENT    Pt meets malnutrition criteria.     CRITERIA FOR MALNUTRITION DIAGNOSIS:  Criteria for non-severe malnutrition diagnosis: chronic illness related to wt loss 5% in 1 month, energy intake less than75% for gre Last 6 Encounters:  01/05/18 : 54.4 kg (120 lb)  12/26/17 : 57 kg (125 lb 9.6 oz)  12/18/17 : 56.4 kg (124 lb 4.8 oz)  12/04/17 : 57.5 kg (126 lb 11.2 oz)  11/27/17 : 57.7 kg (127 lb 1.6 oz)  11/20/17 : 57.6 kg (126 lb 14.4 oz)        NUTRITION:  Diet: Reg

## 2018-01-06 NOTE — PROGRESS NOTES
Heme/Onc Progress Note    Patient Name: Jesus Shrestha   YOB: 1945   Medical Record Number: [id number]   CSN: [id number]   Attending Physician: Zaida Trent M.D. Subjective:  Feeling better.  Afebrile this am    Objective:    Vitals:   01/ drainage  Heart: Regular rate and rhythm. Abdomen: Soft, non tender with good bowel sounds. Extremities: Pedal pulses are present. No edema. Neurological: Grossly intact. Lymphatics:  There is no palpable lymphadenopathy  Psych/Depression: Mood and af

## 2018-01-07 NOTE — PROGRESS NOTES
[facility] Progress Note      Patient Name:  Annalisa Hernandez  YOB: 1945  Medical Record Number:  [id number]    Date of visit: 1/8/2018    CHIEF COMPLAINT: Metastatic HER-2 positive breast carcinoma.     HPI:      74year old that I have Current Outpatient Prescriptions:  Prochlorperazine Maleate (COMPAZINE) 10 mg tablet Take 10 mg by mouth every 6 (six) hours as needed for Nausea. Disp:  Rfl:    Biotin 1000 MCG Oral Tab Take 1,000 mcg by mouth daily.  Disp:  Rfl:    Multiple Vitamins-M status post liver biopsy in 10/15 that showed metastatic breast carcinoma that was ER and HER-2 positive. Received letrozole/trastuzumab from 10/15-6/17. Restaging CT scan 3/17 unfortunately showed interval increase in the size of the liver lesion.   Found

## 2018-01-07 NOTE — PROGRESS NOTES
Heme/Onc Progress Note    Patient Name: Tone Hoover   YOB: 1945   Medical Record Number: [id number]   CSN: [id number]   Attending Physician: Giovanna Moscoso M.D. Subjective:  Doing well. Afebrile.  Some loose stools but C dif negative    O bowel sounds. Extremities: Pedal pulses are present. No edema. Neurological: Grossly intact. Lymphatics: There is no palpable lymphadenopathy  Psych/Depression: Mood and affect are appropriate.         Impression and Plan:  1. E coli bacteremia/UTI- on

## 2018-01-07 NOTE — DISCHARGE SUMMARY
[facility] HOSPITALIST  DISCHARGE SUMMARY     Ryan Sanchez Patient Status:  Inpatient    11/15/1945 MRN [id number]   [facility] 4NW-A Attending No att. providers found   Hosp Day # 2 PCP Milagros Ross DO     Date of Admission: 2018  Date o She feels better after IVF has been given to her in ER. Brief Synopsis:     Patient admitted for GNR bacteremia and treated with meropenem while admitted. She remained afebrile and blood cultures to date have not grown any bacteria.  She denies any Refills:  0            Follow-up appointment:   Placido Jose, [address] [facility]  [phone]    Schedule an appointment as soon as possible for a visit in 1 week      Dinorah Garcia MD  [address] [facility]

## 2018-01-07 NOTE — PROGRESS NOTES
Resting quietly without c/o pain or signs of distress, continues on antibiotics and IVF,remains afebrile, ambulatory in room, stools negative, comfortable,continue to monitor.

## 2018-01-08 NOTE — PROGRESS NOTES
Education Record    Learner:  Patient, spouse    Disease / Diagnosis: breast ca, positive blood culture, dehydration.      Barriers / Limitations:  None   Comments:    Method:  Discussion   Comments:    General Topics:  Plan of care reviewed   Comments:

## 2018-01-08 NOTE — PROGRESS NOTES
Pt here for post-hospital f/u. D/C from hospital yesterday. Pt continues on Levaquin daily. No fevers.    Education Record    Learner:  Patient    Disease / Diagnosis:breast ca    Barriers / Limitations:  None   Comments:    Method:  Brief focused and Print

## 2018-01-09 NOTE — PROGRESS NOTES
Initial Post Discharge Follow Up   Discharge Date: 1/7/18  Contact Date: 1/9/2018    Consent Verification:  Assessment Completed With: Patient  HIPAA Verified?   Yes    Discharge Dx:   Sepsis due to [place] Actis likely from urosepsis     General:   • How have y HCl (IMODIUM A-D) 2 MG Oral Tab Take 2 mg by mouth 4 (four) times daily as needed for Diarrhea. Disp:  Rfl:    Metoprolol Succinate ER 25 MG Oral Tablet 24 Hr Take 1 tablet (25 mg total) by mouth Daily Beta Blocker.  Disp: 90 tablet Rfl: 0   Ondansetron HCl CST HEMATOLOGY ONCOLOGY FOLLOW UP with Lawrence Roman MD [facility] in [facility] ([address] [facility])    Alonso 15, 2018  1:00 PM CST CHEMO INFUSION CHEMOTHERAPY with VANDANA Ca in [facility]

## 2018-01-09 NOTE — TELEPHONE ENCOUNTER
Contacted pt for TCM. Pt currently does not have her TCM/HFU appointment scheduled. An appointment was offered however the pt declined. Pt is scheduled to see Dr. Michelle Rocha on 1/15/18.  TCM/HFU is recommended by 1/21/18 as pt is a moderate risk for readmission

## 2018-01-15 ENCOUNTER — TELEPHONE (OUTPATIENT)
Dept: HEMATOLOGY/ONCOLOGY | Facility: HOSPITAL | Age: 73
End: 2018-01-15

## 2018-01-15 ENCOUNTER — APPOINTMENT (OUTPATIENT)
Dept: HEMATOLOGY/ONCOLOGY | Age: 73
End: 2018-01-15
Attending: INTERNAL MEDICINE
Payer: MEDICARE

## 2018-01-16 ENCOUNTER — TELEPHONE (OUTPATIENT)
Dept: HEMATOLOGY/ONCOLOGY | Facility: HOSPITAL | Age: 73
End: 2018-01-16

## 2018-01-19 ENCOUNTER — TELEPHONE (OUTPATIENT)
Dept: HEMATOLOGY/ONCOLOGY | Facility: HOSPITAL | Age: 73
End: 2018-01-19

## 2018-01-20 ENCOUNTER — TELEPHONE (OUTPATIENT)
Dept: HEMATOLOGY/ONCOLOGY | Facility: HOSPITAL | Age: 73
End: 2018-01-20

## 2018-01-21 ENCOUNTER — TELEPHONE (OUTPATIENT)
Dept: HEMATOLOGY/ONCOLOGY | Facility: HOSPITAL | Age: 73
End: 2018-01-21

## 2018-01-23 ENCOUNTER — TELEPHONE (OUTPATIENT)
Dept: HEMATOLOGY/ONCOLOGY | Facility: HOSPITAL | Age: 73
End: 2018-01-23

## 2018-01-24 ENCOUNTER — TELEPHONE (OUTPATIENT)
Dept: HEMATOLOGY/ONCOLOGY | Facility: HOSPITAL | Age: 73
End: 2018-01-24

## 2018-01-24 NOTE — TELEPHONE ENCOUNTER
Pt's daughter, Shante Win, also calling with questions regarding pt's Q-T prolongation dx.      Will forward to MD.

## 2018-01-25 ENCOUNTER — TELEPHONE (OUTPATIENT)
Dept: HEMATOLOGY/ONCOLOGY | Facility: HOSPITAL | Age: 73
End: 2018-01-25

## 2018-01-25 NOTE — TELEPHONE ENCOUNTER
Spoke to daughter Lazara Haas. Unsure about cause of death as post mortem not done. I saw her 1/8 and culture was neg. Could be PE or sudden cardiac event. ECHO 7/17 ok and ok in 2016 and 2015.  Per daughter Carolyn Stephens has h/o prolonged QT in her family and her dad dies

## 2018-01-31 ENCOUNTER — TELEPHONE (OUTPATIENT)
Dept: HEMATOLOGY/ONCOLOGY | Facility: HOSPITAL | Age: 73
End: 2018-01-31

## 2018-02-14 ENCOUNTER — TELEPHONE (OUTPATIENT)
Dept: HEMATOLOGY/ONCOLOGY | Facility: HOSPITAL | Age: 73
End: 2018-02-14

## 2018-02-14 NOTE — TELEPHONE ENCOUNTER
Spoke to MD, will give  medical records number to get all of patient's information [phone]. Spoke to , number given and also transferred him.

## 2018-02-14 NOTE — TELEPHONE ENCOUNTER
called wanting to get more information on the medications his wife was taking. Do you want me to just send him to medical records?

## 2018-02-15 ENCOUNTER — TELEPHONE (OUTPATIENT)
Dept: HEMATOLOGY/ONCOLOGY | Facility: HOSPITAL | Age: 73
End: 2018-02-15

## 2018-02-20 ENCOUNTER — TELEPHONE (OUTPATIENT)
Dept: HEMATOLOGY/ONCOLOGY | Facility: HOSPITAL | Age: 73
End: 2018-02-20

## 2023-05-20 NOTE — MR AVS SNAPSHOT
Addended by: JODY SOLIZ on: 5/20/2023 02:42 PM     Modules accepted: Orders     After Visit Summary   1/30/2017    Bign Pretty    MRN: [id number]           Diagnoses this Visit     Malignant neoplasm of central portion of left female breast [facility])    -  Primary     Metastatic cancer to liver [facility])           Allergies     No Known Note: Calculation is only valid when Albumin is less than 4.0g/dL.       Alkaline Phosphatase 98    U/L Final    AST 20  15-41  U/L Final    Alt 18  14-54  U/L Final    Bilirubin, Total 0.5  0.1-2.0  mg/dL Final    Total Protein 7.4  6.1-8.3  g/dL F office, you can view your past visit information in [employer] by going to Visits < Visit Summaries. [employer] questions? Call [phone] for help. [employer] is NOT to be used for urgent needs. For medical emergencies, dial 911.

## (undated) NOTE — MR AVS SNAPSHOT
After Visit Summary   5/15/2017    Abdulaziz Postin    MRN: [id number]           Diagnoses this Visit     Malignant neoplasm of central portion of left female breast [facility])    -  Primary     Metastatic cancer to liver [facility])         Malignant neoplasm of ce Creatinine 0.74  0.55-1.02  mg/dL Final    GFR 81  >=60   Final    Comment:       Estimated GFR units: mL/min/1.73 square meters   eGFR calculated by the CKD-EPI equation.         Calcium, Total 8.8  8.3-10.3  mg/dL Final    Comment:       Total Calciums a Immature Granulocyte % 0.2   % Final               MyChart     Visit MyChart  You can access your MyChart to more actively manage your health care and view more details from this visit by going to [email or web address].   If you've recently had a st

## (undated) NOTE — Clinical Note
2017    Patient: Kristy Osorio  : 11/15/1945 Visit date: 2017    Dear  Dr. Lennette Bosworth, MD,    Thank you for referring Kristy Osorio to my practice. Please find my assessment and plan below.         Assessment   Malignant neoplasm of central portion of le The right breast and axilla are normal.  There is no inversion or drainage from the nipple. There are no mass lesions within the right breast parenchyma. There are no changes to the skin. There is no peau d'orange.   The right axilla shows no lymphadenop

## (undated) NOTE — MR AVS SNAPSHOT
After Visit Summary   3/13/2017    Brittany Shin    MRN: [id number]           Diagnoses this Visit     Malignant neoplasm of central portion of left female breast [facility])    -  Primary     Metastatic cancer to liver [facility])         Other iron deficiency an Monday April 24, 2017 10:15 AM     Appointment with VANDANA Lares at [facility] in [place] [address])   [address]     Visit [employer]  You can access your MyChart to more actively manage your h

## (undated) NOTE — MR AVS SNAPSHOT
After Visit Summary   6/19/2017    Abdulaziz Postin    MRN: [id number]           Diagnoses this Visit     [facility]ast[facility]    -  Primary     Liver metastases ([facility]         Diarrhea, unspecified type           Allergies     No Known Allerg

## (undated) NOTE — MR AVS SNAPSHOT
After Visit Summary   4/24/2017    Rebecca Holden    MRN: [id number]           Diagnoses this Visit     Malignant neoplasm of central portion of left female breast [facility])    -  Primary     Metastatic cancer to liver [facility])           Allergies     No Known Glucose 93  70-99  mg/dL Final    BUN 12  8-20  mg/dL Final    Creatinine 0.75  0.55-1.02  mg/dL Final    GFR 79  >=60   Final    Comment:       Estimated GFR units: mL/min/1.73 square meters   eGFR calculated by the CKD-EPI equation.         Calcium, Tota Basophil % 0.6   % Final    Immature Granulocyte % 0.2   % Final               MyChart     Visit MyChart  You can access your MyChart to more actively manage your health care and view more details from this visit by going to [email or web address].

## (undated) NOTE — MR AVS SNAPSHOT
After Visit Summary   6/5/2017    Abdulaziz Cunningham    MRN: [id number]           Diagnoses this Visit     Malignant neoplasm of central portion of left breast in female, estrogen receptor positive ([facility]    -  Primary     [facility] Creatinine 0.77  0.55-1.02  mg/dL Final    GFR 77  >=60   Final    Comment:       Estimated GFR units: mL/min/1.73 square meters   eGFR calculated by the CKD-EPI equation.         Calcium, Total 8.8  8.3-10.3  mg/dL Final    Comment:       Total Calciums a MyChart     Visit [employer]  You can access your MyChart to more actively manage your health care and view more details from this visit by going to [email or web address].   If you've recently had a stay at the Hospital you can access your d

## (undated) NOTE — MR AVS SNAPSHOT
After Visit Summary   2/20/2017    Annalisa Hernandez    MRN: [id number]           Diagnoses this Visit     Malignant neoplasm of central portion of left female breast [facility])    -  Primary     Metastatic cancer to liver [facility])         Metastatic cancer to axi Result Summary for CBC WITH DIFFERENTIAL WITH PLATELET      Narrative     The following orders were created for panel order CBC WITH DIFFERENTIAL WITH PLATELET.   Procedure                               Abnormality         Status                     ------- HCT 35.7  34.0-50.0  % Final    .0  150.0-450.0  10(3)uL Final    MCV 89.7  81.0-100.0  fL Final    MCH 29.1  27.0-33.2  pg Final    MCHC 32.5  31.0-37.0  g/dL Final    RDW 12.5  11.5-16.0  % Final    RDW-SD 41.4  35.1-46.3  fL Final    Neutrophil

## (undated) NOTE — MR AVS SNAPSHOT
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. If you've recently had a stay at the Hospital you can access your discharge instructions in [employer] by going to Visits < Admission Summaries.  If you've been to the Emergency Depar

## (undated) NOTE — MR AVS SNAPSHOT
[facility] [address]  [place]  [phone]               Thank you for choosing us for your health care visit with Lorena Villanueva PA-C.   We are glad to serve you and happy to provide you clinic staff will provide you with the phone number you should call to schedule your appointment.      If you are confident that your benefit plan will not require a referral or authorization, such as [place], please feel free to schedule your maxim Monitor, or 2D Echocardiograms)  •[facility] call [phone] usually in [address] [facility] in Clinic in [place] at [facility].  [address] Mon-Fri at 8am-7:30pm, and Sat/Sun 9am-430pm  Also at [address]  Call [phone] for info to be seen at least twice a year. .         Allergies as of Feb 27, 2017     No Known Allergies                Today's Vital Signs     BP Pulse Temp Height Weight BMI    142/90 mmHg 80 98.1 °F (36.7 °C) (Temporal) 65\" 135 lb 22.47 kg/m2         Current Med discharge instructions in [employer] by going to Visits < Admission Summaries. If you've been to the Emergency Department or your doctor's office, you can view your past visit information in [employer] by going to Visits < Visit Summaries. [employer] questions?

## (undated) NOTE — MR AVS SNAPSHOT
After Visit Summary   1/30/2017    Nikole Valencia    MRN: [id number]           Diagnoses this Visit     Metastatic cancer to liver [facility])    -  Primary     Liver lesion           Allergies     No Known Allergies      Your Vital Signs Were     BP Pulse Te Visits < Visit Summaries. MyChart questions? Call [phone] for help. [employer] is NOT to be used for urgent needs. For medical emergencies, dial 911.

## (undated) NOTE — MR AVS SNAPSHOT
After Visit Summary   6/19/2017    Arian Lea    MRN: [id number]           Diagnoses this Visit     Malignant neoplasm of central portion of left breast in female, estrogen receptor positive ([facility]    -  Primary     Metastatic cancer to liver ([facility] Creatinine 0.78  0.55-1.02  mg/dL Final    GFR 76  >=60   Final    Comment:       Estimated GFR units: mL/min/1.73 square meters   eGFR calculated by the CKD-EPI equation.         Calcium, Total 8.7  8.3-10.3  mg/dL Final    Comment:       Total Calciums a MyChart     Visit [employer]  You can access your MyChart to more actively manage your health care and view more details from this visit by going to [email or web address] [email or web address].   If you've recently had a stay at the Hospital you can access your d

## (undated) NOTE — LETTER
Printed: 2017    Patient Name: Ede Soares  : 11/15/1945   Medical Record #: [id number]    Consent to Chemotherapy    I, Ede Soares, understand that I have been diagnosed with breast cancer.     I understand that the treatment suggested by my docto

## (undated) NOTE — MR AVS SNAPSHOT
After Visit Summary   1/9/2017    Milady Palmer    MRN: [id number]           Diagnoses this Visit     Malignant neoplasm of central portion of left female breast [facility])    -  Primary     Metastatic cancer to liver [facility])         Metastatic cancer to axil view more details from this visit by going to [email or web address]. If you've recently had a stay at the Hospital you can access your discharge instructions in InfoVistahart by going to Visits < Admission Summaries.  If you've been to the Emergency Depar

## (undated) NOTE — MR AVS SNAPSHOT
After Visit Summary   4/3/2017    Abdulaziz Postin    MRN: [id number]           Diagnoses this Visit     Malignant neoplasm of central portion of left female breast [facility])    -  Primary     Metast[facility]           Allergies     No Known Component Value Flag Ref Range Units Status    WBC 5.6  4.0-13.0  x10(3) uL Final    RBC 3.78 (L) 3.80-5.10  x10(6)uL Final    HGB 11.4 (L) 12.0-16.0  g/dL Final    HCT 34.0  34.0-50.0  % Final    .0  150.0-450.0  10(3)uL Final    MCV 89.9  81.0-10

## (undated) NOTE — MR AVS SNAPSHOT
After Visit Summary   4/24/2017    Annalisa Hernandez    MRN: [id number]           Diagnoses this Visit     Malignant neoplasm of central portion of left female breast [facility])    -  Primary     Metastatic cancer to liver [facility])           Allergies     No Known Appointment with Debby Osei at [facility] in [place] [address])   [address]       Monday June 05, 2017 10:15 AM     App[facility] at [facility] in [place] ([phone])   2

## (undated) NOTE — Clinical Note
Patient does not have her HFU appointment scheduled. An appointment was offered, pt declined. Pt is scheduled to see Dr. David Smith on 1/15/18. Pt stated she is doing better since d/c. She did have a low grade fever yesterday however no fever today.  Pt is tami

## (undated) NOTE — MR AVS SNAPSHOT
[facility] [address]  [address] [phone]               Thank you for choosing us for your health care visit with Cody Smith MD.  We are glad to serve you and happy to provide you wi was performed on October 12, 2016. This was a CT scan revealing some metastasis to liver and a diminishing lung nodule. Clinical exam of the left mastectomy site reveals left chest wall to be without evidence of nodularity.   There are no mass lesions w Jan 30, 2017 10:00 AM   Follow-Up Visit with Ashley Woods MD   [facility] in [facility] in [place])    [address] [place] [address] [phone]            Jan 30, 2017 10:15 AM   CHEMO INFUSION CHEMO office, you can view your past visit information in [employer] by going to Visits < Visit Summaries. nfon questions? Call [phone] for help. nfon is NOT to be used for urgent needs. For medical emergencies, dial 911.         Educational Inform

## (undated) NOTE — MR AVS SNAPSHOT
After Visit Summary   3/13/2017    Tariq Urena    MRN: [id number]           Diagnoses this Visit     Malignant neoplasm of central portion of left female breast [facility])    -  Primary     Metastatic cancer to liver [facility])         Other iron deficiency an Procedure                               Abnormality         Status                     ---------                               -----------         ------                     CBC W/ DIFFERENTIAL[[id number]]          Abnormal            Final result Neutrophil Absolute 3.46  1.30-6.70  x10(3) uL Final    Lymphocyte Absolute 1.29  0.90-4.00  x10(3) uL Final    Monocyte Absolute 0.65 (H) 0.10-0.60  x10(3) uL Final    Eosinophil Absolute 0.10  0.00-0.30  x10(3) uL Final    Basophil Absolute 0.02  0.00-0